# Patient Record
Sex: MALE | Race: BLACK OR AFRICAN AMERICAN | NOT HISPANIC OR LATINO | Employment: FULL TIME | URBAN - NONMETROPOLITAN AREA
[De-identification: names, ages, dates, MRNs, and addresses within clinical notes are randomized per-mention and may not be internally consistent; named-entity substitution may affect disease eponyms.]

---

## 2022-01-21 ENCOUNTER — OFFICE VISIT (OUTPATIENT)
Dept: FAMILY MEDICINE | Facility: CLINIC | Age: 27
End: 2022-01-21
Payer: COMMERCIAL

## 2022-01-21 VITALS
RESPIRATION RATE: 18 BRPM | DIASTOLIC BLOOD PRESSURE: 100 MMHG | OXYGEN SATURATION: 99 % | WEIGHT: 225.38 LBS | TEMPERATURE: 98 F | SYSTOLIC BLOOD PRESSURE: 150 MMHG | HEART RATE: 63 BPM | BODY MASS INDEX: 28.92 KG/M2 | HEIGHT: 74 IN

## 2022-01-21 DIAGNOSIS — K04.7 DENTAL ABSCESS: Primary | ICD-10-CM

## 2022-01-21 DIAGNOSIS — I10 ESSENTIAL HYPERTENSION, BENIGN: ICD-10-CM

## 2022-01-21 PROCEDURE — 3080F DIAST BP >= 90 MM HG: CPT | Mod: ,,, | Performed by: NURSE PRACTITIONER

## 2022-01-21 PROCEDURE — 3077F PR MOST RECENT SYSTOLIC BLOOD PRESSURE >= 140 MM HG: ICD-10-PCS | Mod: ,,, | Performed by: NURSE PRACTITIONER

## 2022-01-21 PROCEDURE — 3080F PR MOST RECENT DIASTOLIC BLOOD PRESSURE >= 90 MM HG: ICD-10-PCS | Mod: ,,, | Performed by: NURSE PRACTITIONER

## 2022-01-21 PROCEDURE — 1159F MED LIST DOCD IN RCRD: CPT | Mod: ,,, | Performed by: NURSE PRACTITIONER

## 2022-01-21 PROCEDURE — 3008F PR BODY MASS INDEX (BMI) DOCUMENTED: ICD-10-PCS | Mod: ,,, | Performed by: NURSE PRACTITIONER

## 2022-01-21 PROCEDURE — 96372 THER/PROPH/DIAG INJ SC/IM: CPT | Mod: ,,, | Performed by: NURSE PRACTITIONER

## 2022-01-21 PROCEDURE — 1160F RVW MEDS BY RX/DR IN RCRD: CPT | Mod: ,,, | Performed by: NURSE PRACTITIONER

## 2022-01-21 PROCEDURE — 96372 PR INJECTION,THERAP/PROPH/DIAG2ST, IM OR SUBCUT: ICD-10-PCS | Mod: ,,, | Performed by: NURSE PRACTITIONER

## 2022-01-21 PROCEDURE — 3008F BODY MASS INDEX DOCD: CPT | Mod: ,,, | Performed by: NURSE PRACTITIONER

## 2022-01-21 PROCEDURE — 1160F PR REVIEW ALL MEDS BY PRESCRIBER/CLIN PHARMACIST DOCUMENTED: ICD-10-PCS | Mod: ,,, | Performed by: NURSE PRACTITIONER

## 2022-01-21 PROCEDURE — 99203 PR OFFICE/OUTPT VISIT, NEW, LEVL III, 30-44 MIN: ICD-10-PCS | Mod: 25,,, | Performed by: NURSE PRACTITIONER

## 2022-01-21 PROCEDURE — 1159F PR MEDICATION LIST DOCUMENTED IN MEDICAL RECORD: ICD-10-PCS | Mod: ,,, | Performed by: NURSE PRACTITIONER

## 2022-01-21 PROCEDURE — 99203 OFFICE O/P NEW LOW 30 MIN: CPT | Mod: 25,,, | Performed by: NURSE PRACTITIONER

## 2022-01-21 PROCEDURE — 3077F SYST BP >= 140 MM HG: CPT | Mod: ,,, | Performed by: NURSE PRACTITIONER

## 2022-01-21 RX ORDER — AMLODIPINE BESYLATE 10 MG/1
10 TABLET ORAL DAILY
Qty: 30 TABLET | Refills: 0 | Status: SHIPPED | OUTPATIENT
Start: 2022-01-21 | End: 2022-03-28 | Stop reason: SDUPTHER

## 2022-01-21 RX ORDER — AMOXICILLIN 500 MG/1
CAPSULE ORAL
COMMUNITY
Start: 2022-01-20 | End: 2022-04-14

## 2022-01-21 RX ORDER — CEFTRIAXONE 1 G/1
1 INJECTION, POWDER, FOR SOLUTION INTRAMUSCULAR; INTRAVENOUS
Status: COMPLETED | OUTPATIENT
Start: 2022-01-21 | End: 2022-01-21

## 2022-01-21 RX ORDER — IBUPROFEN 800 MG/1
800 TABLET ORAL EVERY 6 HOURS PRN
COMMUNITY
Start: 2022-01-20 | End: 2022-04-14

## 2022-01-21 RX ADMIN — CEFTRIAXONE 1 G: 1 INJECTION, POWDER, FOR SOLUTION INTRAMUSCULAR; INTRAVENOUS at 03:01

## 2022-01-21 NOTE — PROGRESS NOTES
TATE White   [place] [facility]  [address]  [place] [address]     PATIENT NAME: Kal Meadows  : 1995  DATE: 22  MRN: [id number]      Billing Provider: TATE White  Level of Service: SD OFFICE/OUTPT VISIT, NEW, LEVL III, 30-44 MIN  Patient PCP Information     Provider PCP Type    TATE White General          Reason for Visit / Chief Complaint: Facial Swelling (Right side of face swollen from tooth since Wednesday.)       Update PCP  Update Chief Complaint         History of Present Illness / Problem Focused Workflow     Kal Meadows presents to the clinic swelling right cheek and upper tooth pain for the past 3-4 days. Went to dentist yesterday and was started on amoxicillin and has appt to return next week to have tooth pulled.  Pt has been on BP medication in the past but would forget to take it.      Review of Systems     Review of Systems   Constitutional: Negative.  Negative for activity change, appetite change and fever.   HENT: Positive for dental problem (dental caries right upper molar). Negative for drooling, ear pain and trouble swallowing.    Respiratory: Negative for cough and shortness of breath.    Cardiovascular: Negative for chest pain.   Gastrointestinal: Negative for abdominal pain, nausea and vomiting.   Neurological: Negative for weakness and headaches.        Medical / Social / Family History     Past Medical History:   Diagnosis Date    Hypertension        History reviewed. No pertinent surgical history.    Social History    reports that he has never smoked. He has never used smokeless tobacco. He reports previous alcohol use. He reports previous drug use.    Family History  's family history includes Hypertension in his father and mother.    Medications and Allergies     Medications  Outpatient Medications Marked as Taking for the 22 encounter (Office Visit) with TATE Corado   Medication Sig Dispense Refill     "amoxicillin (AMOXIL) 500 MG capsule TAKE 2 CAPSULES BY MOUTH NOW AND THEN TAKE 1 CAPSULE BY MOUTH EVERY 6 HOURS UNTIL GONE      ibuprofen (ADVIL,MOTRIN) 800 MG tablet Take 800 mg by mouth every 6 (six) hours as needed.       Current Facility-Administered Medications for the 1/21/22 encounter (Office Visit) with TATE Corado   Medication Dose Route Frequency Provider Last Rate Last Admin    cefTRIAXone injection 1 g  1 g Intramuscular 1 time in Clinic/HOD TATE Corado           Allergies  Review of patient's allergies indicates:  No Known Allergies    Physical Examination     Vitals:    01/21/22 1447 01/21/22 1500   BP: (!) 152/118 (!) 150/100   BP Location: Left arm    Patient Position: Sitting    BP Method: Large (Manual)    Pulse: 63    Resp: 18    Temp: 98.3 °F (36.8 °C)    TempSrc: Oral    SpO2: 99%    Weight: 102.2 kg (225 lb 6.4 oz)    Height: 6' 2" (1.88 m)       Physical Exam  Constitutional:       General: He is not in acute distress.     Appearance: Normal appearance.   HENT:      Nose: No congestion.      Mouth/Throat:      Dentition: Dental tenderness, dental caries and dental abscesses present.     Neck:      Thyroid: No thyromegaly.   Cardiovascular:      Rate and Rhythm: Normal rate and regular rhythm.      Pulses: Normal pulses.      Heart sounds: Normal heart sounds.   Pulmonary:      Effort: Pulmonary effort is normal. No accessory muscle usage or respiratory distress.      Breath sounds: Normal breath sounds.   Abdominal:      Palpations: Abdomen is soft.   Musculoskeletal:         General: Normal range of motion.      Cervical back: Neck supple.   Lymphadenopathy:      Cervical: Cervical adenopathy present.      Right cervical: Superficial cervical adenopathy present.   Skin:     General: Skin is warm and dry.      Coloration: Skin is not jaundiced or pale.   Neurological:      Mental Status: He is alert and oriented to person, place, and time.          Assessment and Plan " (including Health Maintenance)      Problem List  Smart Sets  Document Outside HM   :        Health Maintenance Due   Topic Date Due    Hepatitis C Screening  Never done    Lipid Panel  Never done    COVID-19 Vaccine (1) Never done    HIV Screening  Never done    TETANUS VACCINE  Never done    Influenza Vaccine (1) Never done       Problem List Items Addressed This Visit    None     Visit Diagnoses     Dental abscess    -  Primary    Will give rocephin injection and pt to complete amoxil and follow up with dentist next week    Essential hypertension, benign        Will restart amlodipine 10 mg daily along with low sodium diet. Discussed risks of uncontrolled HTN. Pt. verbalizes understanding        Dental abscess  Comments:  Will give rocephin injection and pt to complete amoxil and follow up with dentist next week    Essential hypertension, benign  Comments:  Will restart amlodipine 10 mg daily along with low sodium diet. Discussed risks of uncontrolled HTN. Pt. verbalizes understanding    Other orders  -     cefTRIAXone injection 1 g  -     amLODIPine (NORVASC) 10 MG tablet; Take 1 tablet (10 mg total) by mouth once daily.  Dispense: 30 tablet; Refill: 0       The patient has no Health Maintenance topics of status Not Due    Procedures       Follow up in about 1 month (around 2/21/2022) for recheck bp or sooner if needed.     Signature:  TATE White    Date of encounter: 1/21/22

## 2022-01-21 NOTE — LETTER
January 21, 2022      [facility]  [address]  [place] [address]  Phone: [phone]  Fax: [phone]       Patient: Kal Meadows   YOB: 1995  Date of Visit: 01/21/2022    To Whom It May Concern:    Young Meadows  was at [facility] on 01/21/2022. The patient may return to work/school on 01/24/2022 with no restrictions. If you have any questions or concerns, or if I can be of further assistance, please do not hesitate to contact me.      Sincerely,    TATE Corado

## 2022-03-28 DIAGNOSIS — I10 ESSENTIAL HYPERTENSION, BENIGN: Primary | ICD-10-CM

## 2022-03-28 RX ORDER — AMLODIPINE BESYLATE 10 MG/1
10 TABLET ORAL DAILY
Qty: 30 TABLET | Refills: 1 | Status: SHIPPED | OUTPATIENT
Start: 2022-03-28 | End: 2022-06-21

## 2022-04-14 ENCOUNTER — OFFICE VISIT (OUTPATIENT)
Dept: FAMILY MEDICINE | Facility: CLINIC | Age: 27
End: 2022-04-14
Payer: COMMERCIAL

## 2022-04-14 VITALS
DIASTOLIC BLOOD PRESSURE: 98 MMHG | RESPIRATION RATE: 16 BRPM | BODY MASS INDEX: 29.37 KG/M2 | TEMPERATURE: 98 F | SYSTOLIC BLOOD PRESSURE: 152 MMHG | WEIGHT: 228.81 LBS | OXYGEN SATURATION: 99 % | HEART RATE: 74 BPM | HEIGHT: 74 IN

## 2022-04-14 DIAGNOSIS — R21 RASH OF PENIS: Primary | ICD-10-CM

## 2022-04-14 PROCEDURE — 3008F BODY MASS INDEX DOCD: CPT | Mod: ,,, | Performed by: FAMILY MEDICINE

## 2022-04-14 PROCEDURE — 86695 HERPES SIMPLEX 1 & 2 IGG: ICD-10-PCS | Mod: ,,, | Performed by: CLINICAL MEDICAL LABORATORY

## 2022-04-14 PROCEDURE — 1159F MED LIST DOCD IN RCRD: CPT | Mod: ,,, | Performed by: FAMILY MEDICINE

## 2022-04-14 PROCEDURE — 86695 HERPES SIMPLEX TYPE 1 TEST: CPT | Mod: ,,, | Performed by: CLINICAL MEDICAL LABORATORY

## 2022-04-14 PROCEDURE — 99213 OFFICE O/P EST LOW 20 MIN: CPT | Mod: ,,, | Performed by: FAMILY MEDICINE

## 2022-04-14 PROCEDURE — 86696 HERPES SIMPLEX 1 & 2 IGG: ICD-10-PCS | Mod: ,,, | Performed by: CLINICAL MEDICAL LABORATORY

## 2022-04-14 PROCEDURE — 3080F PR MOST RECENT DIASTOLIC BLOOD PRESSURE >= 90 MM HG: ICD-10-PCS | Mod: ,,, | Performed by: FAMILY MEDICINE

## 2022-04-14 PROCEDURE — 86694 HERPES SIMPLEX NES ANTBDY: CPT | Mod: ,,, | Performed by: CLINICAL MEDICAL LABORATORY

## 2022-04-14 PROCEDURE — 86696 HERPES SIMPLEX TYPE 2 TEST: CPT | Mod: ,,, | Performed by: CLINICAL MEDICAL LABORATORY

## 2022-04-14 PROCEDURE — 86694 HERPES SIMPLEX 1 & 2 IGM: ICD-10-PCS | Mod: ,,, | Performed by: CLINICAL MEDICAL LABORATORY

## 2022-04-14 PROCEDURE — 99213 PR OFFICE/OUTPT VISIT, EST, LEVL III, 20-29 MIN: ICD-10-PCS | Mod: ,,, | Performed by: FAMILY MEDICINE

## 2022-04-14 PROCEDURE — 3008F PR BODY MASS INDEX (BMI) DOCUMENTED: ICD-10-PCS | Mod: ,,, | Performed by: FAMILY MEDICINE

## 2022-04-14 PROCEDURE — 3077F SYST BP >= 140 MM HG: CPT | Mod: ,,, | Performed by: FAMILY MEDICINE

## 2022-04-14 PROCEDURE — 1159F PR MEDICATION LIST DOCUMENTED IN MEDICAL RECORD: ICD-10-PCS | Mod: ,,, | Performed by: FAMILY MEDICINE

## 2022-04-14 PROCEDURE — 3080F DIAST BP >= 90 MM HG: CPT | Mod: ,,, | Performed by: FAMILY MEDICINE

## 2022-04-14 PROCEDURE — 3077F PR MOST RECENT SYSTOLIC BLOOD PRESSURE >= 140 MM HG: ICD-10-PCS | Mod: ,,, | Performed by: FAMILY MEDICINE

## 2022-04-14 RX ORDER — ACYCLOVIR 50 MG/G
CREAM TOPICAL
Qty: 60 G | Refills: 5 | Status: SHIPPED | OUTPATIENT
Start: 2022-04-14 | End: 2022-07-11

## 2022-04-14 NOTE — ASSESSMENT & PLAN NOTE
A rash around the base of the penis suggest the herpetic virus but well could be a yeast infection.  Will obtain HSV 1 and 2 IgM and IgG titers.  Until we get the titers back will start him on Zovirax cream 5 times daily.  He is to follow-up after the test are back.

## 2022-04-14 NOTE — PROGRESS NOTES
A he her he is patient says she is not a med pop is on high do that her whole   Hussein Hathaway DO   [facility]  [address]  [place]  [address]      PATIENT NAME: Kal Meadows  : 1995  DATE: 22  MRN: [id number]      Billing Provider: Hussein Hathaway DO  Level of Service:   Patient PCP Information     Provider PCP Type    TATE White General          Reason for Visit / Chief Complaint: Dry Skin (Dry,flaky skin to penis that comes and goes x 1 month. )       Update PCP  Update Chief Complaint         History of Present Illness / Problem Focused Workflow     Kal Meadows presents to the clinic with Dry Skin (Dry,flaky skin to penis that comes and goes x 1 month. )     Her patient complaining of a rash around his penis just before the head of the penis.  He states has been there for about a week.  He denies any pain associated with it he denies any drainage associated with it.  He states his sexual partner has no drainage or ulcerations or history of herpes or other sexually transmitted diseases.    Dry Skin  Pertinent negatives include no abdominal pain, arthralgias, change in bowel habit, chest pain, chills, congestion, coughing, fatigue, fever, headaches, myalgias, nausea, neck pain, numbness, rash, sore throat, vertigo, vomiting or weakness.       Review of Systems     Review of Systems   Constitutional: Negative for activity change, appetite change, chills, fatigue and fever.   HENT: Negative for nasal congestion, ear discharge, ear pain, mouth dryness, mouth sores, postnasal drip, sinus pressure/congestion, sore throat and voice change.    Eyes: Negative for pain, discharge, redness, itching and visual disturbance.   Respiratory: Negative for apnea, cough, chest tightness, shortness of breath and wheezing.    Cardiovascular: Negative for chest pain, palpitations and leg swelling.   Gastrointestinal: Negative for abdominal distention, abdominal pain, anal bleeding,  blood in stool, change in bowel habit, constipation, diarrhea, nausea, vomiting, reflux and change in bowel habit.   Endocrine: Negative for cold intolerance, heat intolerance, polydipsia, polyphagia and polyuria.   Genitourinary: Negative for difficulty urinating, enuresis, erectile dysfunction, frequency, genital sores, hematuria and urgency.   Musculoskeletal: Negative for arthralgias, back pain, gait problem, leg pain, myalgias and neck pain.   Integumentary:  Negative for rash, mole/lesion, breast mass and breast discharge.   Allergic/Immunologic: Negative for environmental allergies and food allergies.   Neurological: Negative for dizziness, vertigo, tremors, seizures, syncope, facial asymmetry, speech difficulty, weakness, light-headedness, numbness, headaches, disturbances in coordination, memory loss and coordination difficulties.   Hematological: Negative for adenopathy. Does not bruise/bleed easily.   Psychiatric/Behavioral: Negative for agitation, behavioral problems, confusion, decreased concentration, dysphoric mood, hallucinations, self-injury, sleep disturbance and suicidal ideas. The patient is not nervous/anxious and is not hyperactive.    Breast: Negative for mass      Medical / Social / Family History     Past Medical History:   Diagnosis Date    Hypertension        Past Surgical History:   Procedure Laterality Date    CIRCUMCISION         Social History    reports that he has never smoked. He has never used smokeless tobacco. He reports previous alcohol use. He reports previous drug use.    Family History  's family history includes Hypertension in his father and mother.    Medications and Allergies     Medications  Outpatient Medications Marked as Taking for the 4/14/22 encounter (Office Visit) with Hussein Hathaway, DO   Medication Sig Dispense Refill    amLODIPine (NORVASC) 10 MG tablet Take 1 tablet (10 mg total) by mouth once daily. 30 tablet 1       Allergies  Review of patient's  allergies indicates:  No Known Allergies    Physical Examination     Vitals:    04/14/22 1538   BP: (!) 152/98   Pulse: 74   Resp: 16   Temp: 97.9 °F (36.6 °C)     Physical Exam  Constitutional:       General: He is not in acute distress.     Appearance: Normal appearance. He is normal weight.   Genitourinary:     Testes: Normal.      Comments: Shaft of the penis just prior to the head there was noted a dry skin with the some ulcerations x3 noted on the right penile shaft just proximal to the end of the penis.  No drainage was noted.  No ulcerations noted otherwise.  No adenopathy noted.  Neurological:      Mental Status: He is alert.               No results found for: WBC, HGB, HCT, MCV, PLT       No results found for: NA, K, CL, CO2, GLU, BUN, CREATININE, CALCIUM, PROT, ALBUMIN, BILITOT, ALKPHOS, AST, ALT, ANIONGAP, ESTGFRAFRICA, EGFRNONAA   No image results found.     Procedures   Assessment and Plan (including Health Maintenance)      Problem List  Smart Sets  Document Outside HM   :    Plan:         Health Maintenance Due   Topic Date Due    Hepatitis C Screening  Never done    Lipid Panel  Never done    COVID-19 Vaccine (1) Never done    HIV Screening  Never done    TETANUS VACCINE  Never done    Influenza Vaccine (1) Never done       Problem List Items Addressed This Visit        Derm    Rash of penis - Primary    Current Assessment & Plan     A rash around the base of the penis suggest the herpetic virus but well could be a yeast infection.  Will obtain HSV 1 and 2 IgM and IgG titers.  Until we get the titers back will start him on Zovirax cream 5 times daily.  He is to follow-up after the test are back.           Relevant Medications    acyclovir 5% (ZOVIRAX) 5 % Crea    Other Relevant Orders    HSV 1 & 2, IgG    HSV 1 & 2, IgM          The patient has no Health Maintenance topics of status Not Due    Future Appointments   Date Time Provider Department Center   5/16/2022  3:00 PM [facility]        Follow up in about 1 week (around 4/21/2022).     Signature:  Hussein Hathaway, [address] [place]  [address]    Date of encounter: 4/14/22

## 2022-04-19 LAB
HSV IGM SER QL IA: NEGATIVE
HSV TYPE 1 AB IGG INDEX: 0.18
HSV TYPE 2 AB IGG INDEX: 0.02
HSV1 IGG SER QL: NEGATIVE
HSV2 IGG SER QL: NEGATIVE

## 2022-04-26 ENCOUNTER — TELEPHONE (OUTPATIENT)
Dept: FAMILY MEDICINE | Facility: CLINIC | Age: 27
End: 2022-04-26
Payer: COMMERCIAL

## 2022-06-21 DIAGNOSIS — I10 ESSENTIAL HYPERTENSION, BENIGN: ICD-10-CM

## 2022-06-21 RX ORDER — AMLODIPINE BESYLATE 10 MG/1
TABLET ORAL
Qty: 30 TABLET | Refills: 0 | Status: SHIPPED | OUTPATIENT
Start: 2022-06-21 | End: 2022-07-11 | Stop reason: DRUGHIGH

## 2022-07-11 ENCOUNTER — OFFICE VISIT (OUTPATIENT)
Dept: FAMILY MEDICINE | Facility: CLINIC | Age: 27
End: 2022-07-11
Payer: COMMERCIAL

## 2022-07-11 VITALS
WEIGHT: 234.81 LBS | BODY MASS INDEX: 30.14 KG/M2 | RESPIRATION RATE: 18 BRPM | TEMPERATURE: 98 F | DIASTOLIC BLOOD PRESSURE: 100 MMHG | HEIGHT: 74 IN | OXYGEN SATURATION: 99 % | HEART RATE: 73 BPM | SYSTOLIC BLOOD PRESSURE: 140 MMHG

## 2022-07-11 DIAGNOSIS — Z13.220 SCREENING FOR LIPOID DISORDERS: ICD-10-CM

## 2022-07-11 DIAGNOSIS — Z13.1 SCREENING FOR DIABETES MELLITUS: ICD-10-CM

## 2022-07-11 DIAGNOSIS — I10 ESSENTIAL HYPERTENSION, BENIGN: ICD-10-CM

## 2022-07-11 DIAGNOSIS — Z00.00 ROUTINE MEDICAL EXAM: Primary | ICD-10-CM

## 2022-07-11 PROBLEM — R21 RASH OF PENIS: Status: RESOLVED | Noted: 2022-04-14 | Resolved: 2022-07-11

## 2022-07-11 LAB
ANION GAP SERPL CALCULATED.3IONS-SCNC: 10 MMOL/L (ref 7–16)
BUN SERPL-MCNC: 15 MG/DL (ref 7–18)
BUN/CREAT SERPL: 12 (ref 6–20)
CALCIUM SERPL-MCNC: 9 MG/DL (ref 8.5–10.1)
CHLORIDE SERPL-SCNC: 105 MMOL/L (ref 98–107)
CHOLEST SERPL-MCNC: 157 MG/DL (ref 0–200)
CHOLEST/HDLC SERPL: 2.7 {RATIO}
CO2 SERPL-SCNC: 30 MMOL/L (ref 21–32)
CREAT SERPL-MCNC: 1.22 MG/DL (ref 0.7–1.3)
GLUCOSE SERPL-MCNC: 80 MG/DL (ref 74–106)
HDLC SERPL-MCNC: 58 MG/DL (ref 40–60)
LDLC SERPL CALC-MCNC: 69 MG/DL
LDLC/HDLC SERPL: 1.2 {RATIO}
NONHDLC SERPL-MCNC: 99 MG/DL
POTASSIUM SERPL-SCNC: 3.8 MMOL/L (ref 3.5–5.1)
SODIUM SERPL-SCNC: 141 MMOL/L (ref 136–145)
TRIGL SERPL-MCNC: 149 MG/DL (ref 35–150)
VLDLC SERPL-MCNC: 30 MG/DL

## 2022-07-11 PROCEDURE — 1160F PR REVIEW ALL MEDS BY PRESCRIBER/CLIN PHARMACIST DOCUMENTED: ICD-10-PCS | Mod: ,,, | Performed by: NURSE PRACTITIONER

## 2022-07-11 PROCEDURE — 80061 LIPID PANEL: ICD-10-PCS | Mod: ,,, | Performed by: CLINICAL MEDICAL LABORATORY

## 2022-07-11 PROCEDURE — 80048 BASIC METABOLIC PANEL: ICD-10-PCS | Mod: ,,, | Performed by: CLINICAL MEDICAL LABORATORY

## 2022-07-11 PROCEDURE — 80048 BASIC METABOLIC PNL TOTAL CA: CPT | Mod: ,,, | Performed by: CLINICAL MEDICAL LABORATORY

## 2022-07-11 PROCEDURE — 3080F DIAST BP >= 90 MM HG: CPT | Mod: ,,, | Performed by: NURSE PRACTITIONER

## 2022-07-11 PROCEDURE — 4010F PR ACE/ARB THEARPY RXD/TAKEN: ICD-10-PCS | Mod: ,,, | Performed by: NURSE PRACTITIONER

## 2022-07-11 PROCEDURE — 4010F ACE/ARB THERAPY RXD/TAKEN: CPT | Mod: ,,, | Performed by: NURSE PRACTITIONER

## 2022-07-11 PROCEDURE — 3077F PR MOST RECENT SYSTOLIC BLOOD PRESSURE >= 140 MM HG: ICD-10-PCS | Mod: ,,, | Performed by: NURSE PRACTITIONER

## 2022-07-11 PROCEDURE — 3080F PR MOST RECENT DIASTOLIC BLOOD PRESSURE >= 90 MM HG: ICD-10-PCS | Mod: ,,, | Performed by: NURSE PRACTITIONER

## 2022-07-11 PROCEDURE — 1159F MED LIST DOCD IN RCRD: CPT | Mod: ,,, | Performed by: NURSE PRACTITIONER

## 2022-07-11 PROCEDURE — 1160F RVW MEDS BY RX/DR IN RCRD: CPT | Mod: ,,, | Performed by: NURSE PRACTITIONER

## 2022-07-11 PROCEDURE — 1159F PR MEDICATION LIST DOCUMENTED IN MEDICAL RECORD: ICD-10-PCS | Mod: ,,, | Performed by: NURSE PRACTITIONER

## 2022-07-11 PROCEDURE — 3008F PR BODY MASS INDEX (BMI) DOCUMENTED: ICD-10-PCS | Mod: ,,, | Performed by: NURSE PRACTITIONER

## 2022-07-11 PROCEDURE — 99395 PREV VISIT EST AGE 18-39: CPT | Mod: ,,, | Performed by: NURSE PRACTITIONER

## 2022-07-11 PROCEDURE — 3008F BODY MASS INDEX DOCD: CPT | Mod: ,,, | Performed by: NURSE PRACTITIONER

## 2022-07-11 PROCEDURE — 80061 LIPID PANEL: CPT | Mod: ,,, | Performed by: CLINICAL MEDICAL LABORATORY

## 2022-07-11 PROCEDURE — 3077F SYST BP >= 140 MM HG: CPT | Mod: ,,, | Performed by: NURSE PRACTITIONER

## 2022-07-11 PROCEDURE — 99395 PR PREVENTIVE VISIT,EST,18-39: ICD-10-PCS | Mod: ,,, | Performed by: NURSE PRACTITIONER

## 2022-07-11 RX ORDER — AMLODIPINE AND OLMESARTAN MEDOXOMIL 10; 20 MG/1; MG/1
1 TABLET ORAL DAILY
Qty: 30 TABLET | Refills: 0 | Status: SHIPPED | OUTPATIENT
Start: 2022-07-11 | End: 2022-08-01 | Stop reason: SDUPTHER

## 2022-07-11 NOTE — PROGRESS NOTES
TATE White   [facility]  [address]  [place] [address]     PATIENT NAME: Kal Meadows  : 1995  DATE: 22  MRN: [id number]      Billing Provider: TATE White  Level of Service: [place] PREVENTIVE VISIT,EST,18-39  Patient PCP Information     Provider PCP Type    TATE White General          Reason for Visit / Chief Complaint: Annual Exam (Here for Healthy You visit.)       Update PCP  Update Chief Complaint         History of Present Illness / Problem Focused Workflow     Kal Meadows presents to the clinic for healthy you and follow up on HTN.  Pt has not been taking BP medication daily as directed and has noticed frequent headaches.      Review of Systems     Review of Systems   Constitutional: Negative.  Negative for activity change, appetite change and unexpected weight change.   HENT: Negative for trouble swallowing.    Eyes: Negative for visual disturbance.   Respiratory: Negative for cough, chest tightness and shortness of breath.    Cardiovascular: Negative for chest pain and palpitations.   Gastrointestinal: Negative for abdominal pain, change in bowel habit, nausea, vomiting and change in bowel habit.   Endocrine: Negative for cold intolerance, heat intolerance, polydipsia, polyphagia and polyuria.   Genitourinary: Negative for frequency and urgency.   Musculoskeletal: Negative for arthralgias.   Neurological: Positive for headaches. Negative for dizziness and weakness.        Medical / Social / Family History     Past Medical History:   Diagnosis Date    Hypertension        Past Surgical History:   Procedure Laterality Date    CIRCUMCISION         Social History    reports that he has never smoked. He has never used smokeless tobacco. He reports previous alcohol use. He reports previous drug use.    Family History  's family history includes Hypertension in his father and mother.    Medications and Allergies     Medications  Outpatient  "Medications Marked as Taking for the 7/11/22 encounter (Office Visit) with TATE Corado   Medication Sig Dispense Refill    [DISCONTINUED] amLODIPine (NORVASC) 10 MG tablet Take 1 tablet by mouth once daily 30 tablet 0       Allergies  Review of patient's allergies indicates:  No Known Allergies    Physical Examination     Vitals:    07/11/22 1546 07/11/22 1554   BP: (!) 140/100 (!) 140/100   BP Location: Left arm Left arm   Patient Position: Sitting Sitting   BP Method: Medium (Manual) Medium (Manual)   Pulse: 73    Resp: 18    Temp: 98.2 °F (36.8 °C)    TempSrc: Temporal    SpO2: 99%    Weight: 106.5 kg (234 lb 12.8 oz)    Height: 6' 2" (1.88 m)       Physical Exam  Constitutional:       General: He is not in acute distress.     Appearance: Normal appearance.   HENT:      Nose: No congestion.   Neck:      Thyroid: No thyromegaly.      Vascular: No carotid bruit.   Cardiovascular:      Rate and Rhythm: Normal rate and regular rhythm.      Pulses:           Carotid pulses are 3+ on the right side and 3+ on the left side.       Posterior tibial pulses are 3+ on the right side and 3+ on the left side.      Heart sounds: Normal heart sounds. No murmur heard.  Pulmonary:      Effort: Pulmonary effort is normal. No accessory muscle usage or respiratory distress.      Breath sounds: Normal breath sounds. No wheezing, rhonchi or rales.   Abdominal:      General: Bowel sounds are normal. There is no distension.      Palpations: Abdomen is soft.      Tenderness: There is no abdominal tenderness.   Musculoskeletal:         General: Normal range of motion.      Cervical back: Neck supple.      Right lower leg: No edema.      Left lower leg: No edema.   Skin:     General: Skin is warm and dry.      Capillary Refill: Capillary refill takes less than 2 seconds.      Coloration: Skin is not jaundiced or pale.   Neurological:      Mental Status: He is alert and oriented to person, place, and time.      Gait: Gait is " intact.   Psychiatric:         Mood and Affect: Mood normal.         Thought Content: Thought content normal.          Assessment and Plan (including Health Maintenance)      Problem List  Smart Sets  Document Outside HM   :          Health Maintenance Due   Topic Date Due    Lipid Panel  Never done    TETANUS VACCINE  Never done       Problem List Items Addressed This Visit    None     Visit Diagnoses     Routine medical exam    -  Primary    Will check routine labs and BMP to evaluate renal function since BP is high.    Screening for lipoid disorders        Fasting lipids ordered. LDL goal < 70    Relevant Orders    Lipid Panel    Screening for diabetes mellitus        Glucose ordered    Relevant Orders    Glucose, Random    Essential hypertension, benign        Will change medication to amlodipine 10 mg/ olmesartan 20 mg daily. Take med as directed.    Check BP at home daily. Low soidium diet.    Relevant Medications    amlodipine-olmesartan (CHRISTIANA) 10-20 mg per tablet    Other Relevant Orders    Basic Metabolic Panel        Routine medical exam  Comments:  Will check routine labs and BMP to evaluate renal function since BP is high.    Screening for lipoid disorders  Comments:  Fasting lipids ordered. LDL goal < 70  Orders:  -     Lipid Panel; Future; Expected date: 07/11/2022    Screening for diabetes mellitus  Comments:  Glucose ordered  Orders:  -     Glucose, Random; Future; Expected date: 07/11/2022    Essential hypertension, benign  Comments:  Will change medication to amlodipine 10 mg/ olmesartan 20 mg daily. Take med as directed.    Check BP at home daily. Low soidium diet.  Orders:  -     amlodipine-olmesartan (CHRISTIANA) 10-20 mg per tablet; Take 1 tablet by mouth once daily.  Dispense: 30 tablet; Refill: 0  -     Basic Metabolic Panel; Future; Expected date: 07/11/2022       Health Maintenance Topics with due status: Not Due       Topic Last Completion Date    Influenza Vaccine Not Due       Procedures      Future Appointments   Date Time Provider Department Center   8/1/2022  3:30 PM TATE Corado [facility]   [facility]12[facility]20[facility]        Follow up in about 3 weeks (around 8/1/2022) for recheck BP.     Signature:  TATE White    Date of encounter: 7/11/22

## 2022-08-01 ENCOUNTER — OFFICE VISIT (OUTPATIENT)
Dept: FAMILY MEDICINE | Facility: CLINIC | Age: 27
End: 2022-08-01
Payer: COMMERCIAL

## 2022-08-01 VITALS
HEIGHT: 74 IN | SYSTOLIC BLOOD PRESSURE: 128 MMHG | DIASTOLIC BLOOD PRESSURE: 90 MMHG | TEMPERATURE: 98 F | BODY MASS INDEX: 30.52 KG/M2 | RESPIRATION RATE: 18 BRPM | OXYGEN SATURATION: 99 % | HEART RATE: 85 BPM | WEIGHT: 237.81 LBS

## 2022-08-01 DIAGNOSIS — I10 ESSENTIAL HYPERTENSION, BENIGN: Primary | ICD-10-CM

## 2022-08-01 PROCEDURE — 3074F SYST BP LT 130 MM HG: CPT | Mod: ,,, | Performed by: NURSE PRACTITIONER

## 2022-08-01 PROCEDURE — 3080F DIAST BP >= 90 MM HG: CPT | Mod: ,,, | Performed by: NURSE PRACTITIONER

## 2022-08-01 PROCEDURE — 99213 OFFICE O/P EST LOW 20 MIN: CPT | Mod: ,,, | Performed by: NURSE PRACTITIONER

## 2022-08-01 PROCEDURE — 99213 PR OFFICE/OUTPT VISIT, EST, LEVL III, 20-29 MIN: ICD-10-PCS | Mod: ,,, | Performed by: NURSE PRACTITIONER

## 2022-08-01 PROCEDURE — 3008F PR BODY MASS INDEX (BMI) DOCUMENTED: ICD-10-PCS | Mod: ,,, | Performed by: NURSE PRACTITIONER

## 2022-08-01 PROCEDURE — 1159F PR MEDICATION LIST DOCUMENTED IN MEDICAL RECORD: ICD-10-PCS | Mod: ,,, | Performed by: NURSE PRACTITIONER

## 2022-08-01 PROCEDURE — 3080F PR MOST RECENT DIASTOLIC BLOOD PRESSURE >= 90 MM HG: ICD-10-PCS | Mod: ,,, | Performed by: NURSE PRACTITIONER

## 2022-08-01 PROCEDURE — 4010F ACE/ARB THERAPY RXD/TAKEN: CPT | Mod: ,,, | Performed by: NURSE PRACTITIONER

## 2022-08-01 PROCEDURE — 4010F PR ACE/ARB THEARPY RXD/TAKEN: ICD-10-PCS | Mod: ,,, | Performed by: NURSE PRACTITIONER

## 2022-08-01 PROCEDURE — 1159F MED LIST DOCD IN RCRD: CPT | Mod: ,,, | Performed by: NURSE PRACTITIONER

## 2022-08-01 PROCEDURE — 3008F BODY MASS INDEX DOCD: CPT | Mod: ,,, | Performed by: NURSE PRACTITIONER

## 2022-08-01 PROCEDURE — 1160F PR REVIEW ALL MEDS BY PRESCRIBER/CLIN PHARMACIST DOCUMENTED: ICD-10-PCS | Mod: ,,, | Performed by: NURSE PRACTITIONER

## 2022-08-01 PROCEDURE — 1160F RVW MEDS BY RX/DR IN RCRD: CPT | Mod: ,,, | Performed by: NURSE PRACTITIONER

## 2022-08-01 PROCEDURE — 3074F PR MOST RECENT SYSTOLIC BLOOD PRESSURE < 130 MM HG: ICD-10-PCS | Mod: ,,, | Performed by: NURSE PRACTITIONER

## 2022-08-01 RX ORDER — AMLODIPINE AND OLMESARTAN MEDOXOMIL 10; 20 MG/1; MG/1
1 TABLET ORAL DAILY
Qty: 30 TABLET | Refills: 0 | Status: SHIPPED | OUTPATIENT
Start: 2022-08-01 | End: 2022-10-18

## 2022-08-01 NOTE — PROGRESS NOTES
TATE White   [facility]  [address]  [place] [address]     PATIENT NAME: Kal Meadows  : 1995  DATE: 22  MRN: [id number]      Billing Provider: TATE White  Level of Service: [place] OFFICE/OUTPT VISIT, EST, LEVL III, 20-29 MIN  Patient PCP Information     Provider PCP Type    TATE White General          Reason for Visit / Chief Complaint: Follow-up (Color Me Healthy)       Update PCP  Update Chief Complaint         History of Present Illness / Problem Focused Workflow     Kal Meadows presents to the clinic for CM for HTN. Pt took BP medication just prior to office visit today. Denies headache, dizziness or vision problems.        Review of Systems     Review of Systems   Constitutional: Negative.  Negative for activity change, appetite change and fatigue.   HENT: Negative for trouble swallowing and voice change.    Eyes: Negative for visual disturbance.   Respiratory: Negative for cough, chest tightness and shortness of breath.    Cardiovascular: Negative for chest pain and palpitations.   Gastrointestinal: Negative for abdominal pain, change in bowel habit, nausea, vomiting and change in bowel habit.   Endocrine: Negative for cold intolerance, heat intolerance, polydipsia, polyphagia and polyuria.   Genitourinary: Negative for difficulty urinating and frequency.   Musculoskeletal: Negative for gait problem.   Neurological: Negative for dizziness, weakness and headaches.        Medical / Social / Family History     Past Medical History:   Diagnosis Date    Hypertension        Past Surgical History:   Procedure Laterality Date    CIRCUMCISION         Social History    reports that he has never smoked. He has never used smokeless tobacco. He reports previous alcohol use. He reports previous drug use.    Family History  's family history includes Hypertension in his father and mother.    Medications and Allergies     Medications  Outpatient  "Medications Marked as Taking for the 8/1/22 encounter (Office Visit) with TATE Corado   Medication Sig Dispense Refill    [DISCONTINUED] amlodipine-olmesartan (CHRISTIANA) 10-20 mg per tablet Take 1 tablet by mouth once daily. 30 tablet 0       Allergies  Review of patient's allergies indicates:  No Known Allergies    Physical Examination     Vitals:    08/01/22 1600   BP: (!) 128/90   Pulse: 85   Resp: 18   Temp: 97.5 °F (36.4 °C)   SpO2: 99%   Weight: 107.9 kg (237 lb 12.8 oz)   Height: 6' 2" (1.88 m)      Physical Exam  Constitutional:       General: He is not in acute distress.     Appearance: Normal appearance.   HENT:      Nose: No congestion.      Mouth/Throat:      Mouth: Mucous membranes are moist.   Eyes:      General: No scleral icterus.     Conjunctiva/sclera: Conjunctivae normal.      Pupils: Pupils are equal, round, and reactive to light.   Neck:      Thyroid: No thyromegaly.      Vascular: No carotid bruit or JVD.   Cardiovascular:      Rate and Rhythm: Normal rate and regular rhythm.      Pulses:           Carotid pulses are 3+ on the right side and 3+ on the left side.       Posterior tibial pulses are 3+ on the right side.      Heart sounds: Normal heart sounds. No murmur heard.  Pulmonary:      Effort: Pulmonary effort is normal. No accessory muscle usage or respiratory distress.      Breath sounds: Normal breath sounds. No wheezing, rhonchi or rales.   Abdominal:      General: Bowel sounds are normal. There is no distension.      Palpations: Abdomen is soft.      Tenderness: There is no abdominal tenderness.   Musculoskeletal:         General: Normal range of motion.      Cervical back: Neck supple.      Right lower leg: No edema.      Left lower leg: No edema.   Skin:     General: Skin is warm and dry.      Capillary Refill: Capillary refill takes less than 2 seconds.      Coloration: Skin is not jaundiced or pale.   Neurological:      General: No focal deficit present.      Mental Status: " He is alert and oriented to person, place, and time.      Gait: Gait is intact.          Assessment and Plan (including Health Maintenance)      Problem List  Smart Sets  Document Outside HM   :  Lab Results   Component Value Date    CHOL 157 07/11/2022     Lab Results   Component Value Date    HDL 58 07/11/2022     Lab Results   Component Value Date    LDLCALC 69 07/11/2022     Lab Results   Component Value Date    TRIG 149 07/11/2022     Lab Results   Component Value Date    CHOLHDL 2.7 07/11/2022     CMP  Sodium   Date Value Ref Range Status   07/11/2022 141 136 - 145 mmol/L Final     Potassium   Date Value Ref Range Status   07/11/2022 3.8 3.5 - 5.1 mmol/L Final     Chloride   Date Value Ref Range Status   07/11/2022 105 98 - 107 mmol/L Final     CO2   Date Value Ref Range Status   07/11/2022 30 21 - 32 mmol/L Final     Glucose   Date Value Ref Range Status   07/11/2022 80 74 - 106 mg/dL Final     BUN   Date Value Ref Range Status   07/11/2022 15 7 - 18 mg/dL Final     Creatinine   Date Value Ref Range Status   07/11/2022 1.22 0.70 - 1.30 mg/dL Final     Calcium   Date Value Ref Range Status   07/11/2022 9.0 8.5 - 10.1 mg/dL Final     Anion Gap   Date Value Ref Range Status   07/11/2022 10 7 - 16 mmol/L Final     eGFR   Date Value Ref Range Status   07/11/2022 76 >=60 mL/min/1.73m² Final           Health Maintenance Due   Topic Date Due    TETANUS VACCINE  Never done       Problem List Items Addressed This Visit    None     Visit Diagnoses     Essential hypertension, benign    -  Primary    Continue amlodipine 10 mg/ olmesartan 20 mg daily. Take med as directed.    Check BP at home daily. Low soidium diet.    Relevant Medications    amlodipine-olmesartan (CHRISTIANA) 10-20 mg per tablet        Essential hypertension, benign  Comments:  Continue amlodipine 10 mg/ olmesartan 20 mg daily. Take med as directed.    Check BP at home daily. Low soidium diet.  Orders:  -     amlodipine-olmesartan (CHRISTIANA) 10-20 mg per tablet;  Take 1 tablet by mouth once daily.  Dispense: 30 tablet; Refill: 0       Health Maintenance Topics with due status: Not Due       Topic Last Completion Date    Influenza Vaccine Not Due       Procedures     Future Appointments   Date Time Provider Department Center   11/1/2022  3:30 PM TATE Corado [facility] [place]   7/12/2023  3:30 PM TATE Corado [facility] [place]        Follow up in about 1 month (around 9/1/2022) for nurse to recheck BP.     Signature:  TATE White    Date of encounter: 8/1/22

## 2022-10-17 DIAGNOSIS — I10 ESSENTIAL HYPERTENSION, BENIGN: ICD-10-CM

## 2022-10-18 RX ORDER — AMLODIPINE AND OLMESARTAN MEDOXOMIL 10; 20 MG/1; MG/1
1 TABLET ORAL DAILY
Qty: 30 TABLET | Refills: 0 | Status: SHIPPED | OUTPATIENT
Start: 2022-10-18 | End: 2022-12-19 | Stop reason: SDUPTHER

## 2022-12-19 DIAGNOSIS — I10 ESSENTIAL HYPERTENSION, BENIGN: Primary | ICD-10-CM

## 2022-12-19 RX ORDER — AMLODIPINE AND OLMESARTAN MEDOXOMIL 10; 20 MG/1; MG/1
1 TABLET ORAL DAILY
Qty: 30 TABLET | Refills: 0 | Status: SHIPPED | OUTPATIENT
Start: 2022-12-19 | End: 2023-01-20 | Stop reason: SDUPTHER

## 2023-01-20 DIAGNOSIS — I10 ESSENTIAL HYPERTENSION, BENIGN: ICD-10-CM

## 2023-01-20 RX ORDER — AMLODIPINE AND OLMESARTAN MEDOXOMIL 10; 20 MG/1; MG/1
1 TABLET ORAL DAILY
Qty: 30 TABLET | Refills: 0 | Status: SHIPPED | OUTPATIENT
Start: 2023-01-20 | End: 2023-03-08 | Stop reason: SDUPTHER

## 2023-03-08 ENCOUNTER — OFFICE VISIT (OUTPATIENT)
Dept: FAMILY MEDICINE | Facility: CLINIC | Age: 28
End: 2023-03-08
Payer: COMMERCIAL

## 2023-03-08 DIAGNOSIS — I10 ESSENTIAL HYPERTENSION, BENIGN: Primary | ICD-10-CM

## 2023-03-08 DIAGNOSIS — Z11.59 ENCOUNTER FOR SCREENING FOR OTHER VIRAL DISEASES: ICD-10-CM

## 2023-03-08 LAB
ALBUMIN SERPL BCP-MCNC: 4.1 G/DL (ref 3.5–5)
ALBUMIN/GLOB SERPL: 1.2 {RATIO}
ALP SERPL-CCNC: 69 U/L (ref 45–115)
ALT SERPL W P-5'-P-CCNC: 28 U/L (ref 16–61)
ANION GAP SERPL CALCULATED.3IONS-SCNC: 7 MMOL/L (ref 7–16)
AST SERPL W P-5'-P-CCNC: 18 U/L (ref 15–37)
BASOPHILS # BLD AUTO: 0.03 K/UL (ref 0–0.2)
BASOPHILS NFR BLD AUTO: 0.6 % (ref 0–1)
BILIRUB SERPL-MCNC: 0.6 MG/DL (ref ?–1.2)
BUN SERPL-MCNC: 12 MG/DL (ref 7–18)
BUN/CREAT SERPL: 11 (ref 6–20)
CALCIUM SERPL-MCNC: 8.9 MG/DL (ref 8.5–10.1)
CHLORIDE SERPL-SCNC: 106 MMOL/L (ref 98–107)
CHOLEST SERPL-MCNC: 146 MG/DL (ref 0–200)
CHOLEST/HDLC SERPL: 3.6 {RATIO}
CO2 SERPL-SCNC: 31 MMOL/L (ref 21–32)
CREAT SERPL-MCNC: 1.14 MG/DL (ref 0.7–1.3)
DIFFERENTIAL METHOD BLD: ABNORMAL
EGFR (NO RACE VARIABLE) (RUSH/TITUS): 90 ML/MIN/1.73M²
EOSINOPHIL # BLD AUTO: 0.35 K/UL (ref 0–0.5)
EOSINOPHIL NFR BLD AUTO: 6.8 % (ref 1–4)
ERYTHROCYTE [DISTWIDTH] IN BLOOD BY AUTOMATED COUNT: 14.6 % (ref 11.5–14.5)
GLOBULIN SER-MCNC: 3.4 G/DL (ref 2–4)
GLUCOSE SERPL-MCNC: 77 MG/DL (ref 74–106)
HCT VFR BLD AUTO: 44.6 % (ref 40–54)
HCV AB SER QL: NORMAL
HDLC SERPL-MCNC: 41 MG/DL (ref 40–60)
HGB BLD-MCNC: 14.3 G/DL (ref 13.5–18)
HIV 1+O+2 AB SERPL QL: NORMAL
IMM GRANULOCYTES # BLD AUTO: 0.01 K/UL (ref 0–0.04)
IMM GRANULOCYTES NFR BLD: 0.2 % (ref 0–0.4)
LDLC SERPL CALC-MCNC: 93 MG/DL
LDLC/HDLC SERPL: 2.3 {RATIO}
LYMPHOCYTES # BLD AUTO: 2.54 K/UL (ref 1–4.8)
LYMPHOCYTES NFR BLD AUTO: 49.5 % (ref 27–41)
MCH RBC QN AUTO: 25.7 PG (ref 27–31)
MCHC RBC AUTO-ENTMCNC: 32.1 G/DL (ref 32–36)
MCV RBC AUTO: 80.2 FL (ref 80–96)
MONOCYTES # BLD AUTO: 0.38 K/UL (ref 0–0.8)
MONOCYTES NFR BLD AUTO: 7.4 % (ref 2–6)
MPC BLD CALC-MCNC: 10.3 FL (ref 9.4–12.4)
NEUTROPHILS # BLD AUTO: 1.82 K/UL (ref 1.8–7.7)
NEUTROPHILS NFR BLD AUTO: 35.5 % (ref 53–65)
NONHDLC SERPL-MCNC: 105 MG/DL
NRBC # BLD AUTO: 0 X10E3/UL
NRBC, AUTO (.00): 0 %
PLATELET # BLD AUTO: 275 K/UL (ref 150–400)
POTASSIUM SERPL-SCNC: 4 MMOL/L (ref 3.5–5.1)
PROT SERPL-MCNC: 7.5 G/DL (ref 6.4–8.2)
RBC # BLD AUTO: 5.56 M/UL (ref 4.6–6.2)
SODIUM SERPL-SCNC: 140 MMOL/L (ref 136–145)
TRIGL SERPL-MCNC: 58 MG/DL (ref 35–150)
VLDLC SERPL-MCNC: 12 MG/DL
WBC # BLD AUTO: 5.13 K/UL (ref 4.5–11)

## 2023-03-08 PROCEDURE — 86803 HEPATITIS C AB TEST: CPT | Mod: ,,, | Performed by: CLINICAL MEDICAL LABORATORY

## 2023-03-08 PROCEDURE — 1160F PR REVIEW ALL MEDS BY PRESCRIBER/CLIN PHARMACIST DOCUMENTED: ICD-10-PCS | Mod: ,,, | Performed by: NURSE PRACTITIONER

## 2023-03-08 PROCEDURE — 3077F SYST BP >= 140 MM HG: CPT | Mod: ,,, | Performed by: NURSE PRACTITIONER

## 2023-03-08 PROCEDURE — 86803 HEPATITIS C ANTIBODY: ICD-10-PCS | Mod: ,,, | Performed by: CLINICAL MEDICAL LABORATORY

## 2023-03-08 PROCEDURE — 3080F PR MOST RECENT DIASTOLIC BLOOD PRESSURE >= 90 MM HG: ICD-10-PCS | Mod: ,,, | Performed by: NURSE PRACTITIONER

## 2023-03-08 PROCEDURE — 3008F PR BODY MASS INDEX (BMI) DOCUMENTED: ICD-10-PCS | Mod: ,,, | Performed by: NURSE PRACTITIONER

## 2023-03-08 PROCEDURE — 80053 COMPREHENSIVE METABOLIC PANEL: ICD-10-PCS | Mod: ,,, | Performed by: CLINICAL MEDICAL LABORATORY

## 2023-03-08 PROCEDURE — 4010F ACE/ARB THERAPY RXD/TAKEN: CPT | Mod: ,,, | Performed by: NURSE PRACTITIONER

## 2023-03-08 PROCEDURE — 1159F PR MEDICATION LIST DOCUMENTED IN MEDICAL RECORD: ICD-10-PCS | Mod: ,,, | Performed by: NURSE PRACTITIONER

## 2023-03-08 PROCEDURE — 87389 HIV 1 / 2 ANTIBODY: ICD-10-PCS | Mod: ,,, | Performed by: CLINICAL MEDICAL LABORATORY

## 2023-03-08 PROCEDURE — 80053 COMPREHEN METABOLIC PANEL: CPT | Mod: ,,, | Performed by: CLINICAL MEDICAL LABORATORY

## 2023-03-08 PROCEDURE — 3008F BODY MASS INDEX DOCD: CPT | Mod: ,,, | Performed by: NURSE PRACTITIONER

## 2023-03-08 PROCEDURE — 99214 PR OFFICE/OUTPT VISIT, EST, LEVL IV, 30-39 MIN: ICD-10-PCS | Mod: ,,, | Performed by: NURSE PRACTITIONER

## 2023-03-08 PROCEDURE — 1160F RVW MEDS BY RX/DR IN RCRD: CPT | Mod: ,,, | Performed by: NURSE PRACTITIONER

## 2023-03-08 PROCEDURE — 4010F PR ACE/ARB THEARPY RXD/TAKEN: ICD-10-PCS | Mod: ,,, | Performed by: NURSE PRACTITIONER

## 2023-03-08 PROCEDURE — 1159F MED LIST DOCD IN RCRD: CPT | Mod: ,,, | Performed by: NURSE PRACTITIONER

## 2023-03-08 PROCEDURE — 87389 HIV-1 AG W/HIV-1&-2 AB AG IA: CPT | Mod: ,,, | Performed by: CLINICAL MEDICAL LABORATORY

## 2023-03-08 PROCEDURE — 85025 CBC WITH DIFFERENTIAL: ICD-10-PCS | Mod: ,,, | Performed by: CLINICAL MEDICAL LABORATORY

## 2023-03-08 PROCEDURE — 3080F DIAST BP >= 90 MM HG: CPT | Mod: ,,, | Performed by: NURSE PRACTITIONER

## 2023-03-08 PROCEDURE — 3077F PR MOST RECENT SYSTOLIC BLOOD PRESSURE >= 140 MM HG: ICD-10-PCS | Mod: ,,, | Performed by: NURSE PRACTITIONER

## 2023-03-08 PROCEDURE — 99214 OFFICE O/P EST MOD 30 MIN: CPT | Mod: ,,, | Performed by: NURSE PRACTITIONER

## 2023-03-08 PROCEDURE — 80061 LIPID PANEL: CPT | Mod: ,,, | Performed by: CLINICAL MEDICAL LABORATORY

## 2023-03-08 PROCEDURE — 80061 LIPID PANEL: ICD-10-PCS | Mod: ,,, | Performed by: CLINICAL MEDICAL LABORATORY

## 2023-03-08 PROCEDURE — 85025 COMPLETE CBC W/AUTO DIFF WBC: CPT | Mod: ,,, | Performed by: CLINICAL MEDICAL LABORATORY

## 2023-03-08 RX ORDER — AMLODIPINE AND OLMESARTAN MEDOXOMIL 10; 20 MG/1; MG/1
1 TABLET ORAL DAILY
Qty: 90 TABLET | Refills: 1 | Status: SHIPPED | OUTPATIENT
Start: 2023-03-08 | End: 2023-05-05 | Stop reason: ALTCHOICE

## 2023-03-08 NOTE — PROGRESS NOTES
Carline Bateman NP   [facility]  [address]  [place]  [address]      PATIENT NAME: Kal Meadows  : 1995  DATE: 3/8/23  MRN: [id number]      Billing Provider: Carline Bateman NP  Level of Service: [place] OFFICE/OUTPT VISIT, EST, LEVL IV, 30-39 MIN  Patient PCP Information       Provider PCP Type    Carline Bateman NP General            Reason for Visit / Chief Complaint: Hypertension (Here for routine checkup, labs, and refill. Has been out for 2 weeks. )       Update PCP  Update Chief Complaint         History of Present Illness / Problem Focused Workflow     Kal Meadows presents to the clinic with Hypertension (Here for routine checkup, labs, and refill. Has been out for 2 weeks. )     27 year old male presents to clinic for check up and medication refill. He has PMH of HTN and states he has been without medications for over 2 weeks. His blood pressure is extremely elevated in clinic today. He denies headache, blurred vision, or chest pain. He refuses Clonidine. States he will  meds as soon as he leaves and start back on them and BP will come down.       Review of Systems     @Review of Systems   Constitutional:  Negative for activity change, appetite change, fatigue and fever.   HENT:  Negative for nasal congestion, ear pain, rhinorrhea, sinus pressure/congestion and sore throat.    Eyes:  Negative for pain, redness, visual disturbance and eye dryness.   Respiratory:  Negative for cough and shortness of breath.    Cardiovascular:  Negative for chest pain and leg swelling.   Gastrointestinal:  Negative for abdominal distention, abdominal pain, constipation and diarrhea.   Endocrine: Negative for cold intolerance, heat intolerance and polyuria.   Genitourinary:  Negative for bladder incontinence, dysuria, frequency and urgency.   Musculoskeletal:  Negative for arthralgias, gait problem and myalgias.   Integumentary:  Negative for color change, rash and wound.    Allergic/Immunologic: Negative for environmental allergies and food allergies.   Neurological:  Negative for dizziness, weakness, light-headedness and headaches.   Psychiatric/Behavioral:  Negative for behavioral problems and sleep disturbance.      Medical / Social / Family History     Past Medical History:   Diagnosis Date    Hypertension        Past Surgical History:   Procedure Laterality Date    CIRCUMCISION         Social History    reports that he has never smoked. He has never been exposed to tobacco smoke. He has never used smokeless tobacco. He reports that he does not currently use alcohol. He reports that he does not use drugs.    Family History  's family history includes Breast cancer in his maternal grandmother and paternal grandmother; Hypertension in his brother, father, and mother; No Known Problems in his maternal grandfather, paternal grandfather, sister, and son.    Medications and Allergies     Medications  Outpatient Medications Marked as Taking for the 3/8/23 encounter (Office Visit) with Carline Bateman NP   Medication Sig Dispense Refill    [DISCONTINUED] amlodipine-olmesartan (CHRISTIANA) 10-20 mg per tablet Take 1 tablet by mouth once daily. 30 tablet 0       Allergies  Review of patient's allergies indicates:  No Known Allergies    Physical Examination     Vitals:    03/08/23 0900   BP: (!) 156/98   Pulse:    Resp:    Temp:      Physical Exam  Vitals and nursing note reviewed.   HENT:      Head: Normocephalic.      Right Ear: Tympanic membrane normal.      Left Ear: Tympanic membrane normal.      Nose: Nose normal.      Mouth/Throat:      Mouth: Mucous membranes are moist.      Pharynx: Oropharynx is clear. No posterior oropharyngeal erythema.   Eyes:      Conjunctiva/sclera: Conjunctivae normal.   Cardiovascular:      Rate and Rhythm: Normal rate and regular rhythm.      Pulses: Normal pulses.      Heart sounds: Normal heart sounds.   Pulmonary:      Effort: Pulmonary effort is  normal.      Breath sounds: Normal breath sounds.   Abdominal:      General: Abdomen is flat. Bowel sounds are normal. There is no distension.      Palpations: Abdomen is soft.   Musculoskeletal:         General: No swelling or tenderness. Normal range of motion.      Cervical back: Normal range of motion.      Right lower leg: No edema.      Left lower leg: No edema.   Skin:     General: Skin is warm and dry.      Capillary Refill: Capillary refill takes less than 2 seconds.   Neurological:      Mental Status: He is alert. Mental status is at baseline.   Psychiatric:         Mood and Affect: Mood normal.         Behavior: Behavior normal.             Lab Results   Component Value Date    WBC 5.13 03/08/2023    HGB 14.3 03/08/2023    HCT 44.6 03/08/2023    MCV 80.2 03/08/2023     03/08/2023          Sodium   Date Value Ref Range Status   03/08/2023 140 136 - 145 mmol/L Final     Potassium   Date Value Ref Range Status   03/08/2023 4.0 3.5 - 5.1 mmol/L Final     Chloride   Date Value Ref Range Status   03/08/2023 106 98 - 107 mmol/L Final     CO2   Date Value Ref Range Status   03/08/2023 31 21 - 32 mmol/L Final     Glucose   Date Value Ref Range Status   03/08/2023 77 74 - 106 mg/dL Final     BUN   Date Value Ref Range Status   03/08/2023 12 7 - 18 mg/dL Final     Creatinine   Date Value Ref Range Status   03/08/2023 1.14 0.70 - 1.30 mg/dL Final     Calcium   Date Value Ref Range Status   03/08/2023 8.9 8.5 - 10.1 mg/dL Final     Total Protein   Date Value Ref Range Status   03/08/2023 7.5 6.4 - 8.2 g/dL Final     Albumin   Date Value Ref Range Status   03/08/2023 4.1 3.5 - 5.0 g/dL Final     Bilirubin, Total   Date Value Ref Range Status   03/08/2023 0.6 >0.0 - 1.2 mg/dL Final     Alk Phos   Date Value Ref Range Status   03/08/2023 69 45 - 115 U/L Final     AST   Date Value Ref Range Status   03/08/2023 18 15 - 37 U/L Final     ALT   Date Value Ref Range Status   03/08/2023 28 16 - 61 U/L Final     Anion Gap    Date Value Ref Range Status   03/08/2023 7 7 - 16 mmol/L Final     eGFR   Date Value Ref Range Status   07/11/2022 76 >=60 mL/min/1.73m² Final      No image results found.     Procedures   Assessment and Plan (including Health Maintenance)      Problem List  Smart Sets  Document Outside HM   :    Plan:           Problem List Items Addressed This Visit          Cardiac/Vascular    Essential hypertension, benign - Primary    Current Assessment & Plan     Uncontrolled- has been without medications for 2 weeks. Refused Clonidine in clinic. States he will  his meds and take as soon as he gets them. Instructed patient to take meds daily as ordered, do not go without them. Montior BP and keep log. Return to clinic in 2 weeks for follow up to see if BP controlled on current med regimen.   Labs obtained today in clinic.          Relevant Medications    amlodipine-olmesartan (CHRISTIANA) 10-20 mg per tablet    Other Relevant Orders    CBC Auto Differential (Completed)    Comprehensive Metabolic Panel (Completed)    Lipid Panel (Completed)     Other Visit Diagnoses       Encounter for screening for other viral diseases        Relevant Orders    Hepatitis C Antibody (Completed)    HIV 1/2 Ag/Ab (4th Gen) (Completed)            The patient has no Health Maintenance topics of status Not Due    Future Appointments   Date Time Provider Department Center   6/12/2023 10:00 AM Carline Bateman NP [facility]   7/12/2023  3:30 [facility] [place]        Health Maintenance Due   Topic Date Due    TETANUS VACCINE  Never done        Follow up in about 2 weeks (around 3/22/2023).     Signature:  Carline Bateman NP  [place] [address] [place]  [address]    Date of encounter: 3/8/23

## 2023-03-08 NOTE — PROGRESS NOTES
03/08/23 0845   Depression Patient Health Questionnaire (PHQ-2)   Over the last two weeks how often have you been bothered by little interest or pleasure in doing things 0   Over the last two weeks how often have you been bothered by feeling down, depressed or hopeless 0   PHQ-2 Total Score 0   Depression Patient Health Questionnaire (PHQ-9)   Over the last two weeks how often have you been bothered by trouble falling or staying asleep, or sleeping too much 0   Over the last two weeks how often have you been bothered by feeling tired or having little energy 3   Over the last two weeks how often have you been bothered by a poor appetite or overeating 0   Over the last two weeks how often have you been bothered by feeling bad about yourself - or that you are a failure or have let yourself or your family down 0   Over the last two weeks how often have you been bothered by trouble concentrating on things, such as reading the newspaper or watching television 0   Over the last two weeks how often have you been bothered by moving or speaking so slowly that other people could have noticed. Or the opposite - being so fidgety or restless that you have been moving around a lot more than usual. 3   Over the last two weeks how often have you been bothered by thoughts that you would be better off dead, or of hurting yourself 0   If you checked off any problems, how difficult have these problems made it for you to do your work, take care of things at home or get along with other people? Not difficult at all   PHQ-9 Score 6   PHQ-9 Interpretation Mild

## 2023-03-10 NOTE — PROGRESS NOTES
Please contact the patient and let them know that their results were fine and do not require any change in treatment. Continue to monitor blood pressure closely.

## 2023-03-24 VITALS
TEMPERATURE: 97 F | HEIGHT: 74 IN | SYSTOLIC BLOOD PRESSURE: 156 MMHG | DIASTOLIC BLOOD PRESSURE: 98 MMHG | RESPIRATION RATE: 16 BRPM | HEART RATE: 56 BPM | BODY MASS INDEX: 31.11 KG/M2 | WEIGHT: 242.38 LBS | OXYGEN SATURATION: 99 %

## 2023-03-24 PROBLEM — I10 ESSENTIAL HYPERTENSION, BENIGN: Status: ACTIVE | Noted: 2023-03-24

## 2023-03-24 NOTE — ASSESSMENT & PLAN NOTE
Uncontrolled- has been without medications for 2 weeks. Refused Clonidine in clinic. States he will  his meds and take as soon as he gets them. Instructed patient to take meds daily as ordered, do not go without them. Montior BP and keep log. Return to clinic in 2 weeks for follow up to see if BP controlled on current med regimen.   Labs obtained today in clinic.

## 2023-05-04 ENCOUNTER — TELEPHONE (OUTPATIENT)
Dept: FAMILY MEDICINE | Facility: CLINIC | Age: 28
End: 2023-05-04
Payer: COMMERCIAL

## 2023-05-04 NOTE — TELEPHONE ENCOUNTER
Patient's employer called earlier and spoke with Mayra Vicente RN about patient's blood pressure. They reported that patient said he woke up with a headache this morning and took his blood pressure medicine. At 1 pm patient still had a headache so [place] [facility] [place] was 153/116. They reported they let him lay down in a dark quiet room and then rechecked it. His blood pressure was still 138/101. Sriinvas reported patient denied any other symptoms such as chest pain or sob. The decision was made that patient was stable and could come in tomorrow morning. Srinivas was no longer on the line when I picked it up to let them know.     About 30 minutes later patient came to clinic. Henna Lamb LPN spoke with patient and told him to come back in the morning as a walk in and if he started having any chest pain, sob, blurry vision, or if his blood pressure gets worse before then to go to the ER. Patient was asked if he has been taking his blood pressure medication every day and he reports he has.     Patient's blood pressure has been around this range when he comes in for his visits. Provider will discuss treatment plan when patient comes in tomorrow.

## 2023-05-05 ENCOUNTER — OFFICE VISIT (OUTPATIENT)
Dept: FAMILY MEDICINE | Facility: CLINIC | Age: 28
End: 2023-05-05
Payer: COMMERCIAL

## 2023-05-05 VITALS
RESPIRATION RATE: 18 BRPM | SYSTOLIC BLOOD PRESSURE: 128 MMHG | WEIGHT: 241 LBS | DIASTOLIC BLOOD PRESSURE: 94 MMHG | TEMPERATURE: 99 F | BODY MASS INDEX: 30.93 KG/M2 | HEART RATE: 78 BPM | HEIGHT: 74 IN | OXYGEN SATURATION: 98 %

## 2023-05-05 DIAGNOSIS — J30.1 ALLERGIC RHINITIS DUE TO POLLEN, UNSPECIFIED SEASONALITY: ICD-10-CM

## 2023-05-05 DIAGNOSIS — J30.1 SEASONAL ALLERGIC RHINITIS DUE TO POLLEN: ICD-10-CM

## 2023-05-05 DIAGNOSIS — I10 ESSENTIAL HYPERTENSION, BENIGN: Primary | ICD-10-CM

## 2023-05-05 PROCEDURE — 99213 PR OFFICE/OUTPT VISIT, EST, LEVL III, 20-29 MIN: ICD-10-PCS | Mod: ,,, | Performed by: FAMILY MEDICINE

## 2023-05-05 PROCEDURE — 99213 OFFICE O/P EST LOW 20 MIN: CPT | Mod: ,,, | Performed by: FAMILY MEDICINE

## 2023-05-05 PROCEDURE — 3008F PR BODY MASS INDEX (BMI) DOCUMENTED: ICD-10-PCS | Mod: ,,, | Performed by: FAMILY MEDICINE

## 2023-05-05 PROCEDURE — 4010F ACE/ARB THERAPY RXD/TAKEN: CPT | Mod: ,,, | Performed by: FAMILY MEDICINE

## 2023-05-05 PROCEDURE — 3074F SYST BP LT 130 MM HG: CPT | Mod: ,,, | Performed by: FAMILY MEDICINE

## 2023-05-05 PROCEDURE — 3074F PR MOST RECENT SYSTOLIC BLOOD PRESSURE < 130 MM HG: ICD-10-PCS | Mod: ,,, | Performed by: FAMILY MEDICINE

## 2023-05-05 PROCEDURE — 3008F BODY MASS INDEX DOCD: CPT | Mod: ,,, | Performed by: FAMILY MEDICINE

## 2023-05-05 PROCEDURE — 4010F PR ACE/ARB THEARPY RXD/TAKEN: ICD-10-PCS | Mod: ,,, | Performed by: FAMILY MEDICINE

## 2023-05-05 PROCEDURE — 1159F MED LIST DOCD IN RCRD: CPT | Mod: ,,, | Performed by: FAMILY MEDICINE

## 2023-05-05 PROCEDURE — 3080F PR MOST RECENT DIASTOLIC BLOOD PRESSURE >= 90 MM HG: ICD-10-PCS | Mod: ,,, | Performed by: FAMILY MEDICINE

## 2023-05-05 PROCEDURE — 1159F PR MEDICATION LIST DOCUMENTED IN MEDICAL RECORD: ICD-10-PCS | Mod: ,,, | Performed by: FAMILY MEDICINE

## 2023-05-05 PROCEDURE — 3080F DIAST BP >= 90 MM HG: CPT | Mod: ,,, | Performed by: FAMILY MEDICINE

## 2023-05-05 RX ORDER — LISINOPRIL AND HYDROCHLOROTHIAZIDE 10; 12.5 MG/1; MG/1
1 TABLET ORAL DAILY
Qty: 90 TABLET | Refills: 3 | Status: SHIPPED | OUTPATIENT
Start: 2023-05-05 | End: 2024-03-04

## 2023-05-05 RX ORDER — AMLODIPINE BESYLATE 10 MG/1
10 TABLET ORAL DAILY
Qty: 30 TABLET | Refills: 11 | Status: SHIPPED | OUTPATIENT
Start: 2023-05-05 | End: 2024-05-04

## 2023-05-05 RX ORDER — LORATADINE 10 MG/1
10 TABLET ORAL DAILY
Qty: 30 TABLET | Refills: 5 | Status: SHIPPED | OUTPATIENT
Start: 2023-05-05 | End: 2024-03-05

## 2023-05-05 NOTE — LETTER
May 5, 2023      [facility]  [address]  [place] [address]  Phone: [phone]  Fax: [phone]       Patient: Kal Meadows   YOB: 1995  Date of Visit: 05/05/2023    To Whom It May Concern:    Young Meadows  was at [facility] on 05/05/2023. Please excuse patient for 05/04/2023. The patient may return to work/school on 05/08/2023 with no restrictions. If you have any questions or concerns, or if I can be of further assistance, please do not hesitate to contact me.    Sincerely,    Kim Perry RN

## 2023-05-05 NOTE — ASSESSMENT & PLAN NOTE
Blood pressure is poorly controlled.  Will changes medicines to amlodipine 10 mg and give him lisinopril hydrochlorothiazide otherwise.  To follow-up in 1 month.  Monitor his blood pressures at home.

## 2023-05-05 NOTE — ASSESSMENT & PLAN NOTE
A likely related to allergic rhinitis and sinus pressure.  We will treat the patient with the Claritin 10 mg once a day.  Will also treat his blood pressure.  He will follow him back here in 1 month.  Consider using Flonase twice daily.

## 2023-05-05 NOTE — PROGRESS NOTES
Hussein Hathaway DO   [facility]  [address]  [place]  [address]      PATIENT NAME: Kal Meadows  : 1995  DATE: 23  MRN: [id number]      Billing Provider: Hussein Hathaway DO  Level of Service:   Patient PCP Information       Provider PCP Type    Carline Bateman NP General            Reason for Visit / Chief Complaint: Hypertension (Pt c/o elevated blood pressure yesterday at work. Pt also had a headache yesterday but that has improved. )       Update PCP  Update Chief Complaint         History of Present Illness / Problem Focused Workflow     Kal Meadows presents to the clinic with Hypertension (Pt c/o elevated blood pressure yesterday at work. Pt also had a headache yesterday but that has improved. )     Patient is in today for follow-up on his blood pressure.  He states his pressures have not been well controlled on his current medicines.  Also does report a headache with the pain in his sinus area over the last 2-3 months.  He denies any numbness weakness tingling.  He denies any chest pain shortness breast palpitations PND orthopnea.      Review of Systems     Review of Systems   Constitutional:  Negative for activity change, appetite change, chills, fatigue and fever.   HENT:  Negative for nasal congestion, ear discharge, ear pain, mouth dryness, mouth sores, postnasal drip, sinus pressure/congestion, sore throat and voice change.    Eyes:  Negative for pain, discharge, redness, itching and visual disturbance.   Respiratory:  Negative for apnea, cough, chest tightness, shortness of breath and wheezing.    Cardiovascular:  Negative for chest pain, palpitations and leg swelling.   Gastrointestinal:  Negative for abdominal distention, abdominal pain, anal bleeding, blood in stool, change in bowel habit, constipation, diarrhea, nausea, vomiting, reflux and change in bowel habit.   Endocrine: Negative for cold intolerance, heat intolerance, polydipsia, polyphagia and polyuria.    Genitourinary:  Negative for difficulty urinating, enuresis, erectile dysfunction, frequency, genital sores, hematuria and urgency.   Musculoskeletal:  Negative for arthralgias, back pain, gait problem, leg pain, myalgias and neck pain.   Integumentary:  Negative for rash, mole/lesion, breast mass and breast discharge.   Allergic/Immunologic: Negative for environmental allergies and food allergies.   Neurological:  Positive for headaches. Negative for dizziness, vertigo, tremors, seizures, syncope, facial asymmetry, speech difficulty, weakness, light-headedness, numbness, coordination difficulties, memory loss and coordination difficulties.   Hematological:  Negative for adenopathy. Does not bruise/bleed easily.   Psychiatric/Behavioral:  Negative for agitation, behavioral problems, confusion, decreased concentration, dysphoric mood, hallucinations, self-injury, sleep disturbance and suicidal ideas. The patient is not nervous/anxious and is not hyperactive.    Breast: Negative for mass    Medical / Social / Family History     Past Medical History:   Diagnosis Date    Hypertension        Past Surgical History:   Procedure Laterality Date    CIRCUMCISION         Social History    reports that he has never smoked. He has never been exposed to tobacco smoke. He has never used smokeless tobacco. He reports that he does not currently use alcohol. He reports that he does not use drugs.    Family History  's family history includes Breast cancer in his maternal grandmother and paternal grandmother; Diabetes in his paternal grandfather; Hypertension in his brother, father, and mother; No Known Problems in his maternal grandfather, sister, and son.    Medications and Allergies     Medications  Outpatient Medications Marked as Taking for the 5/5/23 encounter (Office Visit) with Hussein Hathaway, DO   Medication Sig Dispense Refill    [DISCONTINUED] amlodipine-olmesartan (CHRISTIANA) 10-20 mg per tablet Take 1 tablet by mouth  once daily. 90 tablet 1       Allergies  Review of patient's allergies indicates:  No Known Allergies    Physical Examination     Vitals:    05/05/23 0852   BP: (!) 128/94   Pulse: 78   Resp: 18   Temp: 98.7 °F (37.1 °C)     Physical Exam  Constitutional:       Appearance: Normal appearance. He is normal weight.   HENT:      Head: Normocephalic.      Nose: Congestion present.      Mouth/Throat:      Mouth: Mucous membranes are moist.      Pharynx: Oropharynx is clear. No oropharyngeal exudate or posterior oropharyngeal erythema.   Eyes:      General: No scleral icterus.        Right eye: No discharge.         Left eye: No discharge.      Conjunctiva/sclera: Conjunctivae normal.      Pupils: Pupils are equal, round, and reactive to light.   Neck:      Vascular: No carotid bruit.   Cardiovascular:      Rate and Rhythm: Normal rate and regular rhythm.      Pulses: Normal pulses.      Heart sounds: Normal heart sounds. No murmur heard.    No friction rub. No gallop.   Pulmonary:      Effort: Pulmonary effort is normal.      Breath sounds: Normal breath sounds. No wheezing.   Abdominal:      General: Abdomen is flat. Bowel sounds are normal.      Tenderness: There is no abdominal tenderness.   Musculoskeletal:         General: Normal range of motion.      Cervical back: Normal range of motion and neck supple.      Right lower leg: No edema.      Left lower leg: No edema.   Skin:     General: Skin is warm.      Capillary Refill: Capillary refill takes less than 2 seconds.      Findings: No rash.   Neurological:      General: No focal deficit present.      Mental Status: He is alert and oriented to person, place, and time. Mental status is at baseline.      Cranial Nerves: No cranial nerve deficit.      Sensory: No sensory deficit.      Motor: No weakness.      Coordination: Coordination normal.      Gait: Gait normal.      Deep Tendon Reflexes: Reflexes normal.   Psychiatric:         Mood and Affect: Mood normal.          Behavior: Behavior normal.         Thought Content: Thought content normal.         Judgment: Judgment normal.             Lab Results   Component Value Date    WBC 5.13 03/08/2023    HGB 14.3 03/08/2023    HCT 44.6 03/08/2023    MCV 80.2 03/08/2023     03/08/2023          Sodium   Date Value Ref Range Status   03/08/2023 140 136 - 145 mmol/L Final     Potassium   Date Value Ref Range Status   03/08/2023 4.0 3.5 - 5.1 mmol/L Final     Chloride   Date Value Ref Range Status   03/08/2023 106 98 - 107 mmol/L Final     CO2   Date Value Ref Range Status   03/08/2023 31 21 - 32 mmol/L Final     Glucose   Date Value Ref Range Status   03/08/2023 77 74 - 106 mg/dL Final     BUN   Date Value Ref Range Status   03/08/2023 12 7 - 18 mg/dL Final     Creatinine   Date Value Ref Range Status   03/08/2023 1.14 0.70 - 1.30 mg/dL Final     Calcium   Date Value Ref Range Status   03/08/2023 8.9 8.5 - 10.1 mg/dL Final     Total Protein   Date Value Ref Range Status   03/08/2023 7.5 6.4 - 8.2 g/dL Final     Albumin   Date Value Ref Range Status   03/08/2023 4.1 3.5 - 5.0 g/dL Final     Bilirubin, Total   Date Value Ref Range Status   03/08/2023 0.6 >0.0 - 1.2 mg/dL Final     Alk Phos   Date Value Ref Range Status   03/08/2023 69 45 - 115 U/L Final     AST   Date Value Ref Range Status   03/08/2023 18 15 - 37 U/L Final     ALT   Date Value Ref Range Status   03/08/2023 28 16 - 61 U/L Final     Anion Gap   Date Value Ref Range Status   03/08/2023 7 7 - 16 mmol/L Final     eGFR   Date Value Ref Range Status   07/11/2022 76 >=60 mL/min/1.73m² Final      No image results found.     Procedures   Lab Results   Component Value Date    CHOL 146 03/08/2023    CHOL 157 07/11/2022     Lab Results   Component Value Date    HDL 41 03/08/2023    HDL 58 07/11/2022     Lab Results   Component Value Date    LDLCALC 93 03/08/2023    LDLCALC 69 07/11/2022     Lab Results   Component Value Date    TRIG 58 03/08/2023    TRIG 149 07/11/2022     Lab  Results   Component Value Date    CHOLHDL 3.6 03/08/2023    CHOLHDL 2.7 07/11/2022      No results found for: LABA1C, HGBA1C   No results found for: TSH, T8QHVQF, F5XSRFD, THYROIDAB, FREET4   Assessment and Plan (including Health Maintenance)      Problem List  Smart Sets  Document Outside HM   :    Plan:         Health Maintenance Due   Topic Date Due    TETANUS VACCINE  Never done       Problem List Items Addressed This Visit          ENT    Seasonal allergic rhinitis due to pollen    Current Assessment & Plan     A likely related to allergic rhinitis and sinus pressure.  We will treat the patient with the Claritin 10 mg once a day.  Will also treat his blood pressure.  He will follow him back here in 1 month.  Consider using Flonase twice daily.           Relevant Medications    loratadine (CLARITIN) 10 mg tablet       Cardiac/Vascular    Essential hypertension, benign - Primary    Current Assessment & Plan     Blood pressure is poorly controlled.  Will changes medicines to amlodipine 10 mg and give him lisinopril hydrochlorothiazide otherwise.  To follow-up in 1 month.  Monitor his blood pressures at home.           Relevant Medications    amLODIPine (NORVASC) 10 MG tablet    lisinopriL-hydrochlorothiazide (PRINZIDE,ZESTORETIC) 10-12.5 mg per tablet     Other Visit Diagnoses       Allergic rhinitis due to pollen, unspecified seasonality        Relevant Medications    loratadine (CLARITIN) 10 mg tablet            The patient has no Health Maintenance topics of status Not Due    Future Appointments   Date Time Provider Department Center   6/12/2023 10:00 AM Carline Bateman NP [facility] [place]   6/22/2023 10:00 AM [facility] [place]   7/12/2023  3:30 PM TATE Corado [facility] [place]        Follow up in about 4 weeks (around 6/2/2023), or if symptoms worsen or fail to improve.     Signature:  Hussein Hathaway DO  [address]  [address]  [place]  [address]    Date of encounter: 5/5/23

## 2023-06-23 ENCOUNTER — OFFICE VISIT (OUTPATIENT)
Dept: FAMILY MEDICINE | Facility: CLINIC | Age: 28
End: 2023-06-23

## 2023-06-23 VITALS
BODY MASS INDEX: 31.32 KG/M2 | SYSTOLIC BLOOD PRESSURE: 150 MMHG | HEART RATE: 62 BPM | RESPIRATION RATE: 17 BRPM | HEIGHT: 74 IN | OXYGEN SATURATION: 99 % | WEIGHT: 244 LBS | DIASTOLIC BLOOD PRESSURE: 98 MMHG | TEMPERATURE: 98 F

## 2023-06-23 DIAGNOSIS — R07.89 CHEST TIGHTNESS: ICD-10-CM

## 2023-06-23 DIAGNOSIS — J01.00 ACUTE NON-RECURRENT MAXILLARY SINUSITIS: ICD-10-CM

## 2023-06-23 DIAGNOSIS — I10 ESSENTIAL HYPERTENSION: Primary | ICD-10-CM

## 2023-06-23 LAB
EKG 12-LEAD: NORMAL
PR INTERVAL: 199
PRT AXES: NORMAL
QRS DURATION: 84
QT/QTC: NORMAL
VENTRICULAR RATE: 53

## 2023-06-23 PROCEDURE — 96372 THER/PROPH/DIAG INJ SC/IM: CPT | Mod: ,,,

## 2023-06-23 PROCEDURE — 99213 OFFICE O/P EST LOW 20 MIN: CPT | Mod: 25,,,

## 2023-06-23 PROCEDURE — 93000 POCT EKG 12-LEAD: ICD-10-PCS | Mod: ,,,

## 2023-06-23 PROCEDURE — 96372 PR INJECTION,THERAP/PROPH/DIAG2ST, IM OR SUBCUT: ICD-10-PCS | Mod: ,,,

## 2023-06-23 PROCEDURE — 99213 PR OFFICE/OUTPT VISIT, EST, LEVL III, 20-29 MIN: ICD-10-PCS | Mod: 25,,,

## 2023-06-23 PROCEDURE — 93000 ELECTROCARDIOGRAM COMPLETE: CPT | Mod: ,,,

## 2023-06-23 RX ORDER — CEFTRIAXONE 1 G/1
1 INJECTION, POWDER, FOR SOLUTION INTRAMUSCULAR; INTRAVENOUS
Status: COMPLETED | OUTPATIENT
Start: 2023-06-23 | End: 2023-06-23

## 2023-06-23 RX ORDER — DEXAMETHASONE SODIUM PHOSPHATE 4 MG/ML
4 INJECTION, SOLUTION INTRA-ARTICULAR; INTRALESIONAL; INTRAMUSCULAR; INTRAVENOUS; SOFT TISSUE
Status: COMPLETED | OUTPATIENT
Start: 2023-06-23 | End: 2023-06-23

## 2023-06-23 RX ORDER — AZITHROMYCIN 250 MG/1
TABLET, FILM COATED ORAL
Qty: 6 TABLET | Refills: 0 | Status: SHIPPED | OUTPATIENT
Start: 2023-06-23 | End: 2023-06-28

## 2023-06-23 RX ORDER — PREDNISONE 20 MG/1
20 TABLET ORAL 2 TIMES DAILY
Qty: 6 TABLET | Refills: 0 | Status: SHIPPED | OUTPATIENT
Start: 2023-06-23 | End: 2023-06-26

## 2023-06-23 RX ADMIN — CEFTRIAXONE 1 G: 1 INJECTION, POWDER, FOR SOLUTION INTRAMUSCULAR; INTRAVENOUS at 04:06

## 2023-06-23 RX ADMIN — DEXAMETHASONE SODIUM PHOSPHATE 4 MG: 4 INJECTION, SOLUTION INTRA-ARTICULAR; INTRALESIONAL; INTRAMUSCULAR; INTRAVENOUS; SOFT TISSUE at 04:06

## 2023-06-23 NOTE — ASSESSMENT & PLAN NOTE
-Rocephin IM and Decadron IM today.   -Azithromycin and Prednisone RX today.  -Discussed with pt that I think the symptoms were related to his sinus infection and drainage. He states the chest pain followed a coughing spell. He states those symptoms have improved since last week. RTC with worsening, new or persistent symptoms with understanding voiced.   - Reviewed pathology of current symptoms, medication side effects/risk/benefits/directions on taking medications, and worsening or persistent symptoms that require  follow up in next 2-3 days. Saline/steroid nasal sprays, antihistamine use, and multivitamin/elderberry/Zinc use were recommended. Patient was instructed to take antibiotic as directed,  complete entire course to avoid antibiotic resistance, and take OTC probiotic with antibiotic to prevent GI upset. Education prevention techniques were recommended, such as PRN antihistamine and saline/steroid nasal spray use, for the remainder of cold/flu season and during seasonal changes.

## 2023-06-23 NOTE — ASSESSMENT & PLAN NOTE
EKG in clinic today and WNL. Discussed with pt, if he started having chest pain, radiating pain, SOB, palpitations then he needs to go to ER to be evaluated. Pt voiced understanding

## 2023-06-23 NOTE — ASSESSMENT & PLAN NOTE
Pt states he did not take his blood pressure medication today. Discussed with pt the importance of taking medication daily with understanding voiced. Potassium level slightly low, increase in diet. Foods high in potassium include green leafy vegetables, potatoes, bananas, avocados, beans. Monitor BP daily and keep BP daily log to bring to next visit. Exercise at least 5 times a week. Keep scheduled appts. RTC sooner if BP is staying <130/80. Dash diet.    DASH- includes foods rich in K+, calcium and Magnesium.The diet limits foods high in sodium, saturated fats and added sugars. This is a flexible balanced eating plan that helps create heart healthy eating style for life. Diet is rich in vegetable, whole grains and fruits. It includes fat free or low fat dairy products, fish, poultry, nuts. Chose foods high in K+, calcium, magnesium, fiber, protein, and low in saturated fats and sodium.

## 2023-06-23 NOTE — PROGRESS NOTES
TATE MONCADA   [place] [id number] [facility] [address]  [place]  [address]      PATIENT NAME: Kal Meadows  : 1995  DATE: 23  MRN: [id number]      Billing Provider: TATE MONCADA  Level of Service:   Patient PCP Information       Provider PCP Type    Carline Bateman NP General            Reason for Visit / Chief Complaint: Headache (Pt c/o daily headaches X 6 days. Pt states that he was exposed to Asbestos on the job. ), Chest Pain (Pt c/o about 3 episodes of chest pain that lasted about an hour in the last week. ), and Sinus Problem (Pt c/o sinus congestion and drng.)         History of Present Illness / Problem Focused Workflow     Kal Meadows presents to the clinic with Headache (Pt c/o daily headaches X 6 days. Pt states that he was exposed to Asbestos on the job. ), Chest Pain (Pt c/o about 3 episodes of chest pain that lasted about an hour in the last week. ), and Sinus Problem (Pt c/o sinus congestion and drng.)     26 y/o male presents to clinic with complaints of nonproductive cough, sore throat, HA, chest tightness, SOB with cough that started last week. He does state the symptoms have improved. 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 While at the job site, he was exposed to Asbestos and he said he has been worried symptoms were related to this. Pt states the job site was vesta and he does has problems with his sinuses several times yearly. He denies any chest pain, radiating pain, numbness, tingling, fever, chills, GI symptoms      Review of Systems     @Review of Systems   Constitutional:  Negative for chills, fatigue, fever and unexpected weight change.   HENT:  Positive for sinus pressure/congestion and sore throat. Negative for nasal congestion and ear pain.    Eyes:  Negative for pain.   Respiratory:  Positive for cough, chest tightness and shortness of breath. Negative for wheezing.    Cardiovascular:  Negative for  chest pain and palpitations.   Gastrointestinal:  Negative for abdominal pain, blood in stool, change in bowel habit, nausea, vomiting, reflux and change in bowel habit.   Musculoskeletal:  Negative for back pain, joint swelling and neck pain.   Neurological:  Positive for headaches. Negative for dizziness, vertigo, seizures, weakness, light-headedness and numbness.   Psychiatric/Behavioral:  Negative for suicidal ideas.      Medical / Social / Family History     Past Medical History:   Diagnosis Date    Hypertension        Past Surgical History:   Procedure Laterality Date    CIRCUMCISION         Social History    reports that he has never smoked. He has never been exposed to tobacco smoke. He has never used smokeless tobacco. He reports that he does not currently use alcohol. He reports that he does not use drugs.    Family History  's family history includes Breast cancer in his maternal grandmother and paternal grandmother; Diabetes in his paternal grandfather; Hypertension in his brother, father, and mother; No Known Problems in his maternal grandfather, sister, and son.    Medications and Allergies     Medications  Outpatient Medications Marked as Taking for the 6/23/23 encounter (Office Visit) with TATE Resendiz   Medication Sig Dispense Refill    amLODIPine (NORVASC) 10 MG tablet Take 1 tablet (10 mg total) by mouth once daily. 30 tablet 11    lisinopriL-hydrochlorothiazide (PRINZIDE,ZESTORETIC) 10-12.5 mg per tablet Take 1 tablet by mouth once daily. 90 tablet 3     Current Facility-Administered Medications for the 6/23/23 encounter (Office Visit) with TATE Resendiz   Medication Dose Route Frequency Provider Last Rate Last Admin    [COMPLETED] cefTRIAXone injection 1 g  1 g Intramuscular 1 time in Clinic/HOD TATE Resendiz   1 g at 06/23/23 1610    [COMPLETED] dexAMETHasone injection 4 mg  4 mg Intramuscular 1 time in Clinic/HOD TATE Resendiz   4 mg at 06/23/23 1610        Allergies  Review of patient's allergies indicates:  No Known Allergies    Physical Examination     Vitals:    06/23/23 1427   BP: (!) 152/102   Pulse: 62   Resp: 17   Temp: 98.3 °F (36.8 °C)     Physical Exam  Vitals and nursing note reviewed.   Constitutional:       General: He is not in acute distress.     Appearance: Normal appearance.   HENT:      Head: Normocephalic.      Right Ear: Tympanic membrane and ear canal normal.      Left Ear: Ear canal and external ear normal. A middle ear effusion is present. Tympanic membrane is erythematous.      Nose:      Right Turbinates: Pale.      Left Turbinates: Pale.      Right Sinus: Maxillary sinus tenderness and frontal sinus tenderness present.      Left Sinus: Maxillary sinus tenderness and frontal sinus tenderness present.      Mouth/Throat:      Lips: Pink.      Mouth: Mucous membranes are moist.      Pharynx: Uvula midline. Posterior oropharyngeal erythema present. No oropharyngeal exudate.   Eyes:      Pupils: Pupils are equal, round, and reactive to light.   Neck:      Vascular: No carotid bruit.   Cardiovascular:      Rate and Rhythm: Normal rate and regular rhythm.      Heart sounds: Normal heart sounds, S1 normal and S2 normal. No murmur heard.    No friction rub. No gallop.   Pulmonary:      Effort: Pulmonary effort is normal. No respiratory distress.      Breath sounds: Normal breath sounds. No decreased breath sounds, wheezing, rhonchi or rales.   Musculoskeletal:         General: No swelling or tenderness. Normal range of motion.      Cervical back: Normal range of motion and neck supple.   Lymphadenopathy:      Cervical: No cervical adenopathy.   Skin:     General: Skin is warm and dry.      Capillary Refill: Capillary refill takes less than 2 seconds.   Neurological:      Mental Status: He is alert and oriented to person, place, and time.   Psychiatric:         Attention and Perception: Attention normal.         Mood and Affect: Mood normal.          Behavior: Behavior normal. Behavior is cooperative.             Lab Results   Component Value Date    WBC 5.13 03/08/2023    HGB 14.3 03/08/2023    HCT 44.6 03/08/2023    MCV 80.2 03/08/2023     03/08/2023        CMP  Sodium   Date Value Ref Range Status   03/08/2023 140 136 - 145 mmol/L Final     Potassium   Date Value Ref Range Status   03/08/2023 4.0 3.5 - 5.1 mmol/L Final     Chloride   Date Value Ref Range Status   03/08/2023 106 98 - 107 mmol/L Final     CO2   Date Value Ref Range Status   03/08/2023 31 21 - 32 mmol/L Final     Glucose   Date Value Ref Range Status   03/08/2023 77 74 - 106 mg/dL Final     BUN   Date Value Ref Range Status   03/08/2023 12 7 - 18 mg/dL Final     Creatinine   Date Value Ref Range Status   03/08/2023 1.14 0.70 - 1.30 mg/dL Final     Calcium   Date Value Ref Range Status   03/08/2023 8.9 8.5 - 10.1 mg/dL Final     Total Protein   Date Value Ref Range Status   03/08/2023 7.5 6.4 - 8.2 g/dL Final     Albumin   Date Value Ref Range Status   03/08/2023 4.1 3.5 - 5.0 g/dL Final     Bilirubin, Total   Date Value Ref Range Status   03/08/2023 0.6 >0.0 - 1.2 mg/dL Final     Alk Phos   Date Value Ref Range Status   03/08/2023 69 45 - 115 U/L Final     AST   Date Value Ref Range Status   03/08/2023 18 15 - 37 U/L Final     ALT   Date Value Ref Range Status   03/08/2023 28 16 - 61 U/L Final     Anion Gap   Date Value Ref Range Status   03/08/2023 7 7 - 16 mmol/L Final     eGFR   Date Value Ref Range Status   03/08/2023 90 >=60 mL/min/1.73m² Final     Procedures   Assessment and Plan (including Health Maintenance)   :    Plan:           Problem List Items Addressed This Visit          ENT    Acute non-recurrent maxillary sinusitis    Current Assessment & Plan     -Rocephin IM and Decadron IM today.   -Azithromycin and Prednisone RX today.  -Discussed with pt that I think the symptoms were related to his sinus infection and drainage. He states the chest pain followed a coughing spell.  He states those symptoms have improved since last week. RTC with worsening, new or persistent symptoms with understanding voiced.   - Reviewed pathology of current symptoms, medication side effects/risk/benefits/directions on taking medications, and worsening or persistent symptoms that require  follow up in next 2-3 days. Saline/steroid nasal sprays, antihistamine use, and multivitamin/elderberry/Zinc use were recommended. Patient was instructed to take antibiotic as directed,  complete entire course to avoid antibiotic resistance, and take OTC probiotic with antibiotic to prevent GI upset. Education prevention techniques were recommended, such as PRN antihistamine and saline/steroid nasal spray use, for the remainder of cold/flu season and during seasonal changes.         Relevant Medications    dexAMETHasone injection 4 mg (Completed)    azithromycin (Z-SUKHDEEP) 250 MG tablet    predniSONE (DELTASONE) 20 MG tablet    cefTRIAXone injection 1 g (Completed)       Cardiac/Vascular    Essential hypertension - Primary    Current Assessment & Plan     Pt states he did not take his blood pressure medication today. Discussed with pt the importance of taking medication daily with understanding voiced. Potassium level slightly low, increase in diet. Foods high in potassium include green leafy vegetables, potatoes, bananas, avocados, beans. Monitor BP daily and keep BP daily log to bring to next visit. Exercise at least 5 times a week. Keep scheduled appts. RTC sooner if BP is staying <130/80. Dash diet.    DASH- includes foods rich in K+, calcium and Magnesium.The diet limits foods high in sodium, saturated fats and added sugars. This is a flexible balanced eating plan that helps create heart healthy eating style for life. Diet is rich in vegetable, whole grains and fruits. It includes fat free or low fat dairy products, fish, poultry, nuts. Chose foods high in K+, calcium, magnesium, fiber, protein, and low in saturated fats  and sodium.         Chest tightness    Current Assessment & Plan     EKG in clinic today and WNL. Discussed with pt, if he started having chest pain, radiating pain, SOB, palpitations then he needs to go to ER to be evaluated. Pt voiced understanding         Relevant Orders    POCT EKG 12-LEAD (Manually Resulted by Ordering Provider)       The patient has no Health Maintenance topics of status Not Due    No future appointments.     There are no preventive care reminders to display for this patient.       No follow-ups on file.     Signature:  TATE MONCADA  [address]  [id number]    Date of encounter: 6/23/23

## 2023-09-25 PROBLEM — J01.00 ACUTE NON-RECURRENT MAXILLARY SINUSITIS: Status: RESOLVED | Noted: 2023-06-23 | Resolved: 2023-09-25

## 2024-03-04 ENCOUNTER — OFFICE VISIT (OUTPATIENT)
Dept: FAMILY MEDICINE | Facility: CLINIC | Age: 29
End: 2024-03-04
Payer: COMMERCIAL

## 2024-03-04 VITALS
HEIGHT: 74 IN | RESPIRATION RATE: 19 BRPM | BODY MASS INDEX: 30.37 KG/M2 | WEIGHT: 236.63 LBS | SYSTOLIC BLOOD PRESSURE: 130 MMHG | OXYGEN SATURATION: 99 % | DIASTOLIC BLOOD PRESSURE: 90 MMHG | TEMPERATURE: 98 F | HEART RATE: 83 BPM

## 2024-03-04 DIAGNOSIS — R30.0 DYSURIA: Primary | ICD-10-CM

## 2024-03-04 DIAGNOSIS — I10 HYPERTENSION, UNSPECIFIED TYPE: ICD-10-CM

## 2024-03-04 LAB
BILIRUB SERPL-MCNC: NEGATIVE MG/DL
BLOOD URINE, POC: NEGATIVE
COLOR, POC UA: YELLOW
GLUCOSE UR QL STRIP: NEGATIVE
HIV 1+O+2 AB SERPL QL: NORMAL
KETONES UR QL STRIP: NEGATIVE
LEUKOCYTE ESTERASE URINE, POC: NEGATIVE
NITRITE, POC UA: NEGATIVE
PH, POC UA: 7
PROTEIN, POC: NEGATIVE
SPECIFIC GRAVITY, POC UA: 1.02
SYPHILIS AB INTERPRETATION: NORMAL
UROBILINOGEN, POC UA: 0.2

## 2024-03-04 PROCEDURE — 1159F MED LIST DOCD IN RCRD: CPT | Mod: ,,, | Performed by: FAMILY MEDICINE

## 2024-03-04 PROCEDURE — 3008F BODY MASS INDEX DOCD: CPT | Mod: ,,, | Performed by: FAMILY MEDICINE

## 2024-03-04 PROCEDURE — 3080F DIAST BP >= 90 MM HG: CPT | Mod: ,,, | Performed by: FAMILY MEDICINE

## 2024-03-04 PROCEDURE — 1160F RVW MEDS BY RX/DR IN RCRD: CPT | Mod: ,,, | Performed by: FAMILY MEDICINE

## 2024-03-04 PROCEDURE — 86780 TREPONEMA PALLIDUM: CPT | Mod: ,,, | Performed by: CLINICAL MEDICAL LABORATORY

## 2024-03-04 PROCEDURE — 87491 CHLMYD TRACH DNA AMP PROBE: CPT | Mod: ,,, | Performed by: CLINICAL MEDICAL LABORATORY

## 2024-03-04 PROCEDURE — 87389 HIV-1 AG W/HIV-1&-2 AB AG IA: CPT | Mod: ,,, | Performed by: CLINICAL MEDICAL LABORATORY

## 2024-03-04 PROCEDURE — 87661 TRICHOMONAS VAGINALIS AMPLIF: CPT | Mod: ,,, | Performed by: CLINICAL MEDICAL LABORATORY

## 2024-03-04 PROCEDURE — 81003 URINALYSIS AUTO W/O SCOPE: CPT | Mod: QW,,, | Performed by: FAMILY MEDICINE

## 2024-03-04 PROCEDURE — 87086 URINE CULTURE/COLONY COUNT: CPT | Mod: ,,, | Performed by: CLINICAL MEDICAL LABORATORY

## 2024-03-04 PROCEDURE — 3075F SYST BP GE 130 - 139MM HG: CPT | Mod: ,,, | Performed by: FAMILY MEDICINE

## 2024-03-04 PROCEDURE — 87591 N.GONORRHOEAE DNA AMP PROB: CPT | Mod: ,,, | Performed by: CLINICAL MEDICAL LABORATORY

## 2024-03-04 PROCEDURE — 99214 OFFICE O/P EST MOD 30 MIN: CPT | Mod: ,,, | Performed by: FAMILY MEDICINE

## 2024-03-04 RX ORDER — CHLORTHALIDONE 25 MG/1
25 TABLET ORAL DAILY
Qty: 30 TABLET | Refills: 11 | Status: SHIPPED | OUTPATIENT
Start: 2024-03-04 | End: 2025-03-04

## 2024-03-04 NOTE — PROGRESS NOTES
Jez Reaves DO   [facility]  [address] [address] [address]  [phone]      PATIENT NAME: Kal Meadows  : 1995  DATE: 3/4/24  MRN: [id number]      Billing Provider: Jez Reaves DO  Level of Service:   Patient PCP Information       Provider PCP Type    Carline Bateman NP General            Reason for Visit / Chief Complaint: Dysuria (Kal Meadows 28 year old male presents with frequency and burning during urination x2-3 weeks. Reports he does drink mostly soda no water. He has been sexually active within the past month.), Health Maintenance (We discussed these care gaps COVID-19 Vaccine(1) Never done --Patient declined/Influenza Vaccine(1) due on 2023--patient declined), and Hypertension (Patient is supposed to be taking lisinopril-hydrochlorothiazide, but due to his dad and brother bothhaving an allergic reaction to this medication which causes their lips to swell, he never picked it up from pharmacy. Can we please try him on something else BP is 150/96 and he has had BP meds this morning.)       Update PCP  Update Chief Complaint         History of Present Illness / Problem Focused Workflow     Kal Meadows presents to the clinic with Dysuria (Kal Meadows 28 year old male presents with frequency and burning during urination x2-3 weeks. Reports he does drink mostly soda no water. He has been sexually active within the past month.), Health Maintenance (We discussed these care gaps COVID-19 Vaccine(1) Never done --Patient declined/Influenza Vaccine(1) due on 2023--patient declined), and Hypertension (Patient is supposed to be taking lisinopril-hydrochlorothiazide, but due to his dad and brother bothhaving an allergic reaction to this medication which causes their lips to swell, he never picked it up from pharmacy. Can we please try him on something else BP is 150/96 and he has had BP meds this morning.)     Dysuria   Pertinent  negatives include no chills, hematuria, nausea, vomiting, constipation or rash. His past medical history is significant for hypertension.   Hypertension  Pertinent negatives include no chest pain, headaches, palpitations or shortness of breath.       Review of Systems     Review of Systems   Constitutional:  Negative for chills, diaphoresis and fever.   HENT:  Negative for congestion and sore throat.    Respiratory:  Negative for shortness of breath.    Cardiovascular:  Negative for chest pain and palpitations.   Gastrointestinal:  Negative for abdominal pain, constipation, diarrhea, nausea and vomiting.   Genitourinary:  Positive for dysuria. Negative for hematuria.   Skin:  Negative for rash.   Neurological:  Negative for dizziness, light-headedness and headaches.       Medical / Social / Family History     Past Medical History:   Diagnosis Date    Hypertension        Past Surgical History:   Procedure Laterality Date    CIRCUMCISION         Social History  Mr. Meadows  reports that he has never smoked. He has never been exposed to tobacco smoke. He has never used smokeless tobacco. He reports that he does not currently use alcohol. He reports that he does not use drugs.    Family History  Mr.'s Meadows family history includes Breast cancer in his maternal grandmother and paternal grandmother; Diabetes in his paternal grandfather; Hypertension in his brother, father, and mother; No Known Problems in his maternal grandfather, sister, and son.    Medications and Allergies     Medications  Outpatient Medications Marked as Taking for the 3/4/24 encounter (Office Visit) with Jez Reaves, DO   Medication Sig Dispense Refill    amLODIPine (NORVASC) 10 MG tablet Take 1 tablet (10 mg total) by mouth once daily. 30 tablet 11       Allergies  Review of patient's allergies indicates:  No Known Allergies    Physical Examination     Vitals:    03/04/24 1541   BP: (!) 130/90   Pulse:    Resp:    Temp:      Physical  Exam  Constitutional:       General: He is not in acute distress.     Appearance: He is not ill-appearing, toxic-appearing or diaphoretic.   HENT:      Head: Normocephalic and atraumatic.      Right Ear: External ear normal.      Left Ear: External ear normal.      Nose: No congestion or rhinorrhea.      Mouth/Throat:      Mouth: Mucous membranes are moist.      Pharynx: No oropharyngeal exudate or posterior oropharyngeal erythema.   Eyes:      General: No scleral icterus.        Right eye: No discharge.         Left eye: No discharge.      Pupils: Pupils are equal, round, and reactive to light.   Cardiovascular:      Rate and Rhythm: Normal rate.      Heart sounds: No murmur heard.     No friction rub. No gallop.   Pulmonary:      Effort: No respiratory distress.      Breath sounds: No stridor. No wheezing, rhonchi or rales.   Abdominal:      General: There is no distension.      Tenderness: There is no abdominal tenderness. There is no guarding.   Musculoskeletal:         General: No swelling or deformity.      Right lower leg: No edema.      Left lower leg: No edema.   Neurological:      General: No focal deficit present.      Mental Status: He is alert and oriented to person, place, and time.         Assessment and Plan (including Health Maintenance)      Problem List  Smart Sets  Document Outside HM   :    Plan:         Health Maintenance Due   Topic Date Due    COVID-19 Vaccine (1) Never done       Problem List Items Addressed This Visit    None  Visit Diagnoses       Dysuria    -  Primary    Relevant Orders    POCT URINALYSIS W/O SCOPE (Completed)    Urine culture (Completed)    Chlamydia/GC, PCR (Completed)    HIV 1/2 Ag/Ab (4th Gen) (Completed)    Trichomonas vaginalis by PCR (Completed)    Syphilis Antibody with reflex to RPR (Completed)    Hypertension, unspecified type        Relevant Medications    chlorthalidone (HYGROTEN) 25 MG Tab        Patient denies any penile discharge, fevers or chills.  Patient  is sexually active with 1 partner.  Patient will be worked up for possible STI.  Continuing management  contingent upon results.  Patient has also not taken any medication for hypertension.  Patient will be switched to chlorthalidone given his concerns about lisinopril.  Patient given blood pressure log.  Follow up appointment in 2 weeks.  Patient counseled at length to call, return to clinic or present to the ED should symptoms persist or worsen.  Patient's signals understanding and agreement with the plan of care.    The patient has no Health Maintenance topics of status Not Due    Future Appointments   Date Time Provider Department Center   3/18/2024  3:40 PM Jez Reaves DO [facility]            Signature:  Jez Reaves DO  [facility]  [address] [address] [address]  [phone]    Date of encounter: 3/4/24

## 2024-03-05 LAB
CHLAMYDIA BY PCR: NEGATIVE
N. GONORRHOEAE (GC) BY PCR: NEGATIVE
TRICHOMONAS NAT: NEGATIVE

## 2024-03-06 LAB — UA COMPLETE W REFLEX CULTURE PNL UR: NORMAL

## 2024-04-01 ENCOUNTER — OFFICE VISIT (OUTPATIENT)
Dept: FAMILY MEDICINE | Facility: CLINIC | Age: 29
End: 2024-04-01
Payer: COMMERCIAL

## 2024-04-01 VITALS
SYSTOLIC BLOOD PRESSURE: 136 MMHG | OXYGEN SATURATION: 99 % | RESPIRATION RATE: 18 BRPM | HEIGHT: 74 IN | DIASTOLIC BLOOD PRESSURE: 82 MMHG | TEMPERATURE: 98 F | WEIGHT: 227.19 LBS | BODY MASS INDEX: 29.16 KG/M2 | HEART RATE: 89 BPM

## 2024-04-01 DIAGNOSIS — R09.81 HEAD CONGESTION: ICD-10-CM

## 2024-04-01 DIAGNOSIS — J02.9 SORE THROAT: ICD-10-CM

## 2024-04-01 DIAGNOSIS — R05.1 ACUTE COUGH: ICD-10-CM

## 2024-04-01 DIAGNOSIS — J06.9 UPPER RESPIRATORY TRACT INFECTION, UNSPECIFIED TYPE: Primary | ICD-10-CM

## 2024-04-01 LAB
CTP QC/QA: YES
FLUAV AG NPH QL: NEGATIVE
FLUBV AG NPH QL: NEGATIVE
MOLECULAR STREP A: NEGATIVE
SARS-COV-2 RDRP RESP QL NAA+PROBE: NEGATIVE

## 2024-04-01 RX ORDER — GUAIFENESIN AND DEXTROMETHORPHAN HYDROBROMIDE 1200; 60 MG/1; MG/1
1 TABLET, EXTENDED RELEASE ORAL 2 TIMES DAILY
Qty: 20 TABLET | Refills: 0 | Status: SHIPPED | OUTPATIENT
Start: 2024-04-01 | End: 2024-04-11

## 2024-04-01 RX ORDER — AZITHROMYCIN 250 MG/1
TABLET, FILM COATED ORAL
Qty: 6 TABLET | Refills: 0 | Status: SHIPPED | OUTPATIENT
Start: 2024-04-01 | End: 2024-04-06

## 2024-04-01 RX ORDER — DEXAMETHASONE SODIUM PHOSPHATE 4 MG/ML
4 INJECTION, SOLUTION INTRA-ARTICULAR; INTRALESIONAL; INTRAMUSCULAR; INTRAVENOUS; SOFT TISSUE
Status: COMPLETED | OUTPATIENT
Start: 2024-04-01 | End: 2024-04-01

## 2024-04-01 RX ORDER — CEFTRIAXONE 1 G/1
1 INJECTION, POWDER, FOR SOLUTION INTRAMUSCULAR; INTRAVENOUS
Status: COMPLETED | OUTPATIENT
Start: 2024-04-01 | End: 2024-04-01

## 2024-04-01 RX ADMIN — CEFTRIAXONE 1 G: 1 INJECTION, POWDER, FOR SOLUTION INTRAMUSCULAR; INTRAVENOUS at 10:04

## 2024-04-01 RX ADMIN — DEXAMETHASONE SODIUM PHOSPHATE 4 MG: 4 INJECTION, SOLUTION INTRA-ARTICULAR; INTRALESIONAL; INTRAMUSCULAR; INTRAVENOUS; SOFT TISSUE at 10:04

## 2024-04-01 NOTE — LETTER
April 1, 2024      [facility] [address] [place] [address]  Phone: [phone]  Fax: [phone]       Patient: Kal Meadows   YOB: 1995  Date of Visit: 04/01/2024    To Whom It May Concern:    Young Meadows  was at [facility] on 04/01/2024. The patient may return to work/school on 04/02/2024  with no restrictions. If you have any questions or concerns, or if I can be of further assistance, please do not hesitate to contact me.    Sincerely,        ROSA M Mccain

## 2024-04-01 NOTE — ASSESSMENT & PLAN NOTE
Head congestion, sore throat  Negative covid/flu, strep  Discussed risks/benefits/alternatives for treatment  Rocephin, decadron IM  Zithromax po  Rest. Increase fluids as tolerated

## 2024-04-01 NOTE — PROGRESS NOTES
TATE Gaytan   [facility]  [address] [address] [address]  [phone]      PATIENT NAME: Kal Meadows  : 1995  DATE: 24  MRN: [id number]      Billing Provider: TATE Gaytan  Level of Service:   Patient PCP Information       Provider PCP Type    Carline Bateman NP General            Reason for Visit / Chief Complaint: Fever, Generalized Body Aches, Headache, Cough, and Sore Throat (All symptoms started x2 days/Pt is being swab for covid/flu and strep/)       Update PCP  Update Chief Complaint         History of Present Illness / Problem Focused Workflow     28 year old male presents with complaints of congestion, fever, body aches  Symptoms started about 2 days ago      Review of Systems     Review of Systems   Constitutional:  Positive for fatigue and fever.   HENT:  Positive for congestion, sinus pressure and sore throat. Negative for ear pain.    Respiratory:  Positive for cough. Negative for shortness of breath.    Cardiovascular:  Negative for chest pain.   Gastrointestinal:  Negative for abdominal pain, constipation, diarrhea and vomiting.   Endocrine: Negative for polydipsia and polyuria.   Musculoskeletal:  Negative for gait problem.   Allergic/Immunologic: Negative for environmental allergies.   Neurological:  Negative for dizziness, weakness and headaches.   Psychiatric/Behavioral:  Negative for dysphoric mood. The patient is not nervous/anxious.        Medical / Social / Family History     Past Medical History:   Diagnosis Date    Hypertension        Past Surgical History:   Procedure Laterality Date    CIRCUMCISION         Social History    reports that he has never smoked. He has never been exposed to tobacco smoke. He has never used smokeless tobacco. He reports that he does not currently use alcohol. He reports that he does not use drugs.    Family History  's family history includes Breast cancer in his  maternal grandmother and paternal grandmother; Diabetes in his paternal grandfather; Hypertension in his brother, father, and mother; No Known Problems in his maternal grandfather, sister, and son.    Medications and Allergies     Medications  Outpatient Medications Marked as Taking for the 4/1/24 encounter (Office Visit) with Tia Monreal FNP   Medication Sig Dispense Refill    amLODIPine (NORVASC) 10 MG tablet Take 1 tablet (10 mg total) by mouth once daily. 30 tablet 11    chlorthalidone (HYGROTEN) 25 MG Tab Take 1 tablet (25 mg total) by mouth once daily. 30 tablet 11     Current Facility-Administered Medications for the 4/1/24 encounter (Office Visit) with Tia Monreal FNP   Medication Dose Route Frequency Provider Last Rate Last Admin    [COMPLETED] cefTRIAXone injection 1 g  1 g Intramuscular 1 time in Clinic/HOD Tia Monreal FNP   1 g at 04/01/24 1002    [COMPLETED] dexAMETHasone injection 4 mg  4 mg Intramuscular 1 time in Clinic/HOD Tia Monreal FNP   4 mg at 04/01/24 1002       Allergies  Review of patient's allergies indicates:  No Known Allergies    Physical Examination     Vitals:    04/01/24 0903   BP: 136/82   Pulse: 89   Resp: 18   Temp: 98 °F (36.7 °C)     Physical Exam  Constitutional:       General: He is not in acute distress.     Appearance: He is ill-appearing.   HENT:      Ears:      Comments: Redness to bilat TM     Nose: Congestion present. No rhinorrhea.      Mouth/Throat:      Mouth: Mucous membranes are moist.      Pharynx: Oropharyngeal exudate and posterior oropharyngeal erythema present.   Eyes:      Extraocular Movements: Extraocular movements intact.   Cardiovascular:      Rate and Rhythm: Normal rate.   Pulmonary:      Effort: Pulmonary effort is normal. No respiratory distress.      Breath sounds: No wheezing.   Abdominal:      General: Bowel sounds are normal.      Palpations: Abdomen is soft.      Tenderness: There is no abdominal tenderness.   Musculoskeletal:          General: Normal range of motion.      Cervical back: Normal range of motion.   Skin:     General: Skin is warm.   Neurological:      Mental Status: He is alert.   Psychiatric:         Behavior: Behavior normal.           Imaging / Labs     Office Visit on 04/01/2024   Component Date Value Ref Range Status    POC Rapid COVID 04/01/2024 Negative  Negative Final     Acceptable 04/01/2024 Yes   Final    Rapid Influenza A Ag 04/01/2024 Negative  Negative Final    Rapid Influenza B Ag 04/01/2024 Negative  Negative Final     Acceptable 04/01/2024 Yes   Final    Molecular Strep A, POC 04/01/2024 Negative  Negative Final     Acceptable 04/01/2024 Yes   Final     No image results found.      Assessment and Plan (including Health Maintenance)      Problem List  Smart Sets  Document Outside HM   :    Health Maintenance Due   Topic Date Due    COVID-19 Vaccine (1) Never done       Problem List Items Addressed This Visit          ENT    Upper respiratory tract infection - Primary    Current Assessment & Plan     Head congestion, sore throat  Negative covid/flu, strep  Discussed risks/benefits/alternatives for treatment  Rocephin, decadron IM  Zithromax po  Rest. Increase fluids as tolerated         Relevant Medications    cefTRIAXone injection 1 g (Completed)    dexAMETHasone injection 4 mg (Completed)    azithromycin (Z-SUKHDEEP) 250 MG tablet    dextromethorphan-guaiFENesin (MUCINEX DM) 60-1,200 mg per 12 hr tablet     Other Visit Diagnoses       Head congestion        Relevant Medications    dexAMETHasone injection 4 mg (Completed)    dextromethorphan-guaiFENesin (MUCINEX DM) 60-1,200 mg per 12 hr tablet    Acute cough        Relevant Orders    POCT COVID-19 Rapid Screening (Completed)    POCT Influenza A/B (Completed)    Sore throat        Relevant Orders    POCT Strep A, Molecular (Completed)            The patient has no Health Maintenance topics of status Not Due    No future  appointments.       Signature:  TATE Gaytan  [facility] [address] [place] [address]  [phone]    Date of encounter: 4/1/24

## 2024-06-06 ENCOUNTER — OFFICE VISIT (OUTPATIENT)
Dept: FAMILY MEDICINE | Facility: CLINIC | Age: 29
End: 2024-06-06
Payer: COMMERCIAL

## 2024-06-06 VITALS
TEMPERATURE: 98 F | RESPIRATION RATE: 16 BRPM | DIASTOLIC BLOOD PRESSURE: 84 MMHG | BODY MASS INDEX: 28.36 KG/M2 | HEART RATE: 78 BPM | OXYGEN SATURATION: 98 % | HEIGHT: 74 IN | WEIGHT: 221 LBS | SYSTOLIC BLOOD PRESSURE: 129 MMHG

## 2024-06-06 DIAGNOSIS — R30.0 DYSURIA: ICD-10-CM

## 2024-06-06 DIAGNOSIS — I10 ESSENTIAL HYPERTENSION, BENIGN: Primary | ICD-10-CM

## 2024-06-06 PROCEDURE — 99214 OFFICE O/P EST MOD 30 MIN: CPT | Mod: ,,, | Performed by: FAMILY MEDICINE

## 2024-06-06 PROCEDURE — 3074F SYST BP LT 130 MM HG: CPT | Mod: ,,, | Performed by: FAMILY MEDICINE

## 2024-06-06 PROCEDURE — 1159F MED LIST DOCD IN RCRD: CPT | Mod: ,,, | Performed by: FAMILY MEDICINE

## 2024-06-06 PROCEDURE — 3079F DIAST BP 80-89 MM HG: CPT | Mod: ,,, | Performed by: FAMILY MEDICINE

## 2024-06-06 PROCEDURE — 3008F BODY MASS INDEX DOCD: CPT | Mod: ,,, | Performed by: FAMILY MEDICINE

## 2024-06-06 RX ORDER — CEFTRIAXONE 1 G/1
1 INJECTION, POWDER, FOR SOLUTION INTRAMUSCULAR; INTRAVENOUS
Status: SHIPPED | OUTPATIENT
Start: 2024-06-06

## 2024-06-06 RX ORDER — AMLODIPINE BESYLATE 10 MG/1
10 TABLET ORAL DAILY
Qty: 30 TABLET | Refills: 11 | Status: SHIPPED | OUTPATIENT
Start: 2024-06-06 | End: 2025-06-06

## 2024-06-06 NOTE — PROGRESS NOTES
Health Maintenance Due   Topic Date Due    COVID-19 Vaccine (1 - 2023-24 season) Patient Declined

## 2024-06-28 PROCEDURE — 87591 N.GONORRHOEAE DNA AMP PROB: CPT | Mod: ,,, | Performed by: CLINICAL MEDICAL LABORATORY

## 2024-06-28 PROCEDURE — 87491 CHLMYD TRACH DNA AMP PROBE: CPT | Mod: ,,, | Performed by: CLINICAL MEDICAL LABORATORY

## 2024-06-29 LAB
CHLAMYDIA BY PCR: NEGATIVE
N. GONORRHOEAE (GC) BY PCR: NEGATIVE

## 2024-07-03 NOTE — PROGRESS NOTES
Jez Reaves DO   [facility]  [address] [address]  [place]  [address]      PATIENT NAME: Kal Meadows  : 1995  DATE: 24  MRN: [id number]      Billing Provider: Jez Reaves DO  Level of Service:   Patient PCP Information       Provider PCP Type    Carline Bateman NP General            Reason for Visit / Chief Complaint: Follow-up (Patient is a 28 year old male presenting today for a follow-up on Hypertension. ), Hypertension (Pt is needing a refill on his amlodipine. He has been out of both BP meds x 2 days. He has refills on chlorthalidone at the pharmacy. He is also due lab work. He hasn't had this done since 2023. ), and Dysuria (Reports he was having this problem the last time he was here. He states symptoms just started back recently. Dysuria mostly in the mornings. He also c/o urinary frequency. )         History of Present Illness / Problem Focused Workflow     HPI    HPI as noted.  Patient denies penile discharge.  Patient would like workup for STI.    Review of Systems     @Review of Systems   Constitutional:  Negative for chills, diaphoresis and fever.   HENT:  Negative for sore throat.    Eyes:  Negative for visual disturbance.   Respiratory:  Negative for cough and shortness of breath.    Cardiovascular:  Negative for chest pain and palpitations.   Gastrointestinal:  Negative for abdominal pain, diarrhea, nausea and vomiting.   Genitourinary:  Negative for dysuria and hematuria.   Musculoskeletal:  Negative for arthralgias and leg pain.   Integumentary:  Negative for rash.   Neurological:  Negative for dizziness, light-headedness, numbness and headaches.       Medical / Social / Family History     Past Medical History:   Diagnosis Date    Hypertension        Past Surgical History:   Procedure Laterality Date    CIRCUMCISION         Medications and Allergies     Medications  Outpatient Medications Marked as Taking for the 24 encounter (Office Visit) with  Jez Reaves DO   Medication Sig Dispense Refill    chlorthalidone (HYGROTEN) 25 MG Tab Take 1 tablet (25 mg total) by mouth once daily. 30 tablet 11    [DISCONTINUED] amLODIPine (NORVASC) 10 MG tablet Take 1 tablet (10 mg total) by mouth once daily. 30 tablet 11     Current Facility-Administered Medications for the 6/6/24 encounter (Office Visit) with Jez Reaves DO   Medication Dose Route Frequency Provider Last Rate Last Admin    cefTRIAXone injection 1 g  1 g Intramuscular 1 time in Clinic/HOD Jez Reaves DO           Allergies  Review of patient's allergies indicates:  No Known Allergies    Physical Examination     Vitals:    06/06/24 1525   BP: 129/84   Pulse: 78   Resp: 16   Temp: 98.1 °F (36.7 °C)     Physical Exam  Vitals and nursing note reviewed.   Constitutional:       General: He is not in acute distress.     Appearance: He is not ill-appearing, toxic-appearing or diaphoretic.   HENT:      Head: Normocephalic and atraumatic.      Right Ear: External ear normal.      Left Ear: External ear normal.      Nose: Nose normal.      Mouth/Throat:      Pharynx: No oropharyngeal exudate or posterior oropharyngeal erythema.   Eyes:      General: No scleral icterus.        Right eye: No discharge.         Left eye: No discharge.      Extraocular Movements: Extraocular movements intact.   Neck:      Vascular: No carotid bruit.   Cardiovascular:      Rate and Rhythm: Normal rate and regular rhythm.      Pulses: Normal pulses.      Heart sounds: Normal heart sounds. No murmur heard.     No friction rub. No gallop.   Pulmonary:      Effort: Pulmonary effort is normal. No respiratory distress.      Breath sounds: No stridor. No wheezing, rhonchi or rales.   Chest:      Chest wall: No tenderness.   Abdominal:      Palpations: Abdomen is soft.      Tenderness: There is no abdominal tenderness. There is no guarding.   Musculoskeletal:         General: No swelling.      Right lower leg: No edema.      Left  lower leg: No edema.   Skin:     General: Skin is warm and dry.   Neurological:      Mental Status: He is alert and oriented to person, place, and time.               Lab Results   Component Value Date    WBC 5.56 06/28/2024    HGB 13.8 06/28/2024    HCT 44.0 06/28/2024    MCV 81.5 06/28/2024     06/28/2024        CMP  Sodium   Date Value Ref Range Status   06/28/2024 140 136 - 145 mmol/L Final     Potassium   Date Value Ref Range Status   06/28/2024 4.1 3.5 - 5.1 mmol/L Final     Chloride   Date Value Ref Range Status   06/28/2024 104 98 - 107 mmol/L Final     CO2   Date Value Ref Range Status   06/28/2024 29 21 - 32 mmol/L Final     Glucose   Date Value Ref Range Status   06/28/2024 70 (L) 74 - 106 mg/dL Final     BUN   Date Value Ref Range Status   06/28/2024 14 7 - 18 mg/dL Final     Creatinine   Date Value Ref Range Status   06/28/2024 1.20 0.70 - 1.30 mg/dL Final     Calcium   Date Value Ref Range Status   06/28/2024 9.4 8.5 - 10.1 mg/dL Final     Total Protein   Date Value Ref Range Status   06/28/2024 7.4 6.4 - 8.2 g/dL Final     Albumin   Date Value Ref Range Status   06/28/2024 4.1 3.5 - 5.0 g/dL Final     Bilirubin, Total   Date Value Ref Range Status   06/28/2024 0.6 >0.0 - 1.2 mg/dL Final     Alk Phos   Date Value Ref Range Status   06/28/2024 68 45 - 115 U/L Final     AST   Date Value Ref Range Status   06/28/2024 21 15 - 37 U/L Final     ALT   Date Value Ref Range Status   06/28/2024 32 16 - 61 U/L Final     Anion Gap   Date Value Ref Range Status   06/28/2024 11 7 - 16 mmol/L Final     eGFR   Date Value Ref Range Status   06/28/2024 84 >=60 mL/min/1.73m2 Final     Procedures   Assessment and Plan (including Health Maintenance)   :    Plan:     Problem List Items Addressed This Visit    None  Visit Diagnoses       Essential hypertension, benign    -  Primary    Relevant Medications    amLODIPine (NORVASC) 10 MG tablet    Other Relevant Orders    CBC Auto Differential (Completed)     Comprehensive Metabolic Panel (Completed)    Lipid Panel (Completed)    Dysuria        Relevant Medications    cefTRIAXone injection 1 g    Other Relevant Orders    POCT URINALYSIS W/O SCOPE    RPR (Completed)    HIV 1/2 Ag/Ab (4th Gen) (Completed)    Syphilis Antibody with reflex to RPR (Completed)    Chlamydia/GC, PCR (Completed)  Patient will be treated empirically today.  Additional antibiotics may be furnished contingent upon lab results.  Patient will be notified of results.  Patient understands and agrees with the plan of care.            Health Maintenance Topics with due status: Not Due       Topic Last Completion Date    Influenza Vaccine 03/08/2023       Future Appointments   Date Time Provider Department Center   9/11/2024  3:40 PM Carline Bateman, NP [facility]        Health Maintenance Due   Topic Date Due    Pneumococcal Vaccines (Age 0-64) (1 of 2 - PCV) Never done    TETANUS VACCINE  Never done    COVID-19 Vaccine (1 - 2023-24 season) Never done          Signature:  Jez Reaves [facility]  [address]  [address]    Date of encounter: 6/6/24

## 2024-11-01 ENCOUNTER — OFFICE VISIT (OUTPATIENT)
Dept: FAMILY MEDICINE | Facility: CLINIC | Age: 29
End: 2024-11-01
Payer: COMMERCIAL

## 2024-11-01 VITALS
RESPIRATION RATE: 18 BRPM | HEART RATE: 72 BPM | WEIGHT: 241 LBS | HEIGHT: 74 IN | DIASTOLIC BLOOD PRESSURE: 98 MMHG | OXYGEN SATURATION: 98 % | BODY MASS INDEX: 30.93 KG/M2 | TEMPERATURE: 98 F | SYSTOLIC BLOOD PRESSURE: 130 MMHG

## 2024-11-01 DIAGNOSIS — R53.83 FATIGUE, UNSPECIFIED TYPE: Primary | ICD-10-CM

## 2024-11-01 LAB
ALBUMIN SERPL BCP-MCNC: 3.8 G/DL (ref 3.5–5)
ALBUMIN/GLOB SERPL: 1.1 {RATIO}
ALP SERPL-CCNC: 59 U/L (ref 45–115)
ALT SERPL W P-5'-P-CCNC: 41 U/L (ref 16–61)
ANION GAP SERPL CALCULATED.3IONS-SCNC: 10 MMOL/L (ref 7–16)
AST SERPL W P-5'-P-CCNC: 21 U/L (ref 15–37)
BASOPHILS # BLD AUTO: 0.05 K/UL (ref 0–0.2)
BASOPHILS NFR BLD AUTO: 0.8 % (ref 0–1)
BILIRUB SERPL-MCNC: 0.6 MG/DL (ref ?–1.2)
BUN SERPL-MCNC: 12 MG/DL (ref 7–18)
BUN/CREAT SERPL: 11 (ref 6–20)
CALCIUM SERPL-MCNC: 9.3 MG/DL (ref 8.5–10.1)
CHLORIDE SERPL-SCNC: 106 MMOL/L (ref 98–107)
CO2 SERPL-SCNC: 29 MMOL/L (ref 21–32)
CREAT SERPL-MCNC: 1.12 MG/DL (ref 0.7–1.3)
DIFFERENTIAL METHOD BLD: ABNORMAL
EGFR (NO RACE VARIABLE) (RUSH/TITUS): 91 ML/MIN/1.73M2
EOSINOPHIL # BLD AUTO: 0.38 K/UL (ref 0–0.5)
EOSINOPHIL NFR BLD AUTO: 6.4 % (ref 1–4)
ERYTHROCYTE [DISTWIDTH] IN BLOOD BY AUTOMATED COUNT: 14.6 % (ref 11.5–14.5)
FOLATE SERPL-MCNC: 10.8 NG/ML (ref 3.1–17.5)
GLOBULIN SER-MCNC: 3.6 G/DL (ref 2–4)
GLUCOSE SERPL-MCNC: 80 MG/DL (ref 74–106)
HCT VFR BLD AUTO: 46.1 % (ref 40–54)
HGB BLD-MCNC: 14.6 G/DL (ref 13.5–18)
IMM GRANULOCYTES # BLD AUTO: 0.01 K/UL (ref 0–0.04)
IMM GRANULOCYTES NFR BLD: 0.2 % (ref 0–0.4)
LYMPHOCYTES # BLD AUTO: 2.29 K/UL (ref 1–4.8)
LYMPHOCYTES NFR BLD AUTO: 38.8 % (ref 27–41)
MCH RBC QN AUTO: 25 PG (ref 27–31)
MCHC RBC AUTO-ENTMCNC: 31.7 G/DL (ref 32–36)
MCV RBC AUTO: 79.1 FL (ref 80–96)
MONOCYTES # BLD AUTO: 0.49 K/UL (ref 0–0.8)
MONOCYTES NFR BLD AUTO: 8.3 % (ref 2–6)
MPC BLD CALC-MCNC: 9.9 FL (ref 9.4–12.4)
NEUTROPHILS # BLD AUTO: 2.68 K/UL (ref 1.8–7.7)
NEUTROPHILS NFR BLD AUTO: 45.5 % (ref 53–65)
NRBC # BLD AUTO: 0 X10E3/UL
NRBC, AUTO (.00): 0 %
PLATELET # BLD AUTO: 294 K/UL (ref 150–400)
POTASSIUM SERPL-SCNC: 4 MMOL/L (ref 3.5–5.1)
PROT SERPL-MCNC: 7.4 G/DL (ref 6.4–8.2)
RBC # BLD AUTO: 5.83 M/UL (ref 4.6–6.2)
SODIUM SERPL-SCNC: 141 MMOL/L (ref 136–145)
TSH SERPL DL<=0.005 MIU/L-ACNC: 0.93 UIU/ML (ref 0.36–3.74)
VIT B12 SERPL-MCNC: 322 PG/ML (ref 193–986)
WBC # BLD AUTO: 5.9 K/UL (ref 4.5–11)

## 2024-11-01 PROCEDURE — 84443 ASSAY THYROID STIM HORMONE: CPT | Mod: ,,, | Performed by: CLINICAL MEDICAL LABORATORY

## 2024-11-01 PROCEDURE — 82746 ASSAY OF FOLIC ACID SERUM: CPT | Mod: ,,, | Performed by: CLINICAL MEDICAL LABORATORY

## 2024-11-01 PROCEDURE — 80053 COMPREHEN METABOLIC PANEL: CPT | Mod: ,,, | Performed by: CLINICAL MEDICAL LABORATORY

## 2024-11-01 PROCEDURE — 85025 COMPLETE CBC W/AUTO DIFF WBC: CPT | Mod: ,,, | Performed by: CLINICAL MEDICAL LABORATORY

## 2024-11-01 PROCEDURE — 82607 VITAMIN B-12: CPT | Mod: ,,, | Performed by: CLINICAL MEDICAL LABORATORY

## 2024-11-08 ENCOUNTER — TELEPHONE (OUTPATIENT)
Dept: FAMILY MEDICINE | Facility: CLINIC | Age: 29
End: 2024-11-08
Payer: COMMERCIAL

## 2024-11-08 NOTE — TELEPHONE ENCOUNTER
----- Message from Jez Reaves DO sent at 11/7/2024  1:30 PM CST -----  Please advise the patient has labs were grossly normal and require no change in management at this time.  Thank you.

## 2024-11-08 NOTE — TELEPHONE ENCOUNTER
Attempted to reach patient to discuss lab results. No answer,no voicemail. Send patient message through the patient portal.

## 2025-02-07 ENCOUNTER — OFFICE VISIT (OUTPATIENT)
Dept: FAMILY MEDICINE | Facility: CLINIC | Age: 30
End: 2025-02-07
Payer: COMMERCIAL

## 2025-02-07 VITALS
BODY MASS INDEX: 32.42 KG/M2 | HEART RATE: 90 BPM | SYSTOLIC BLOOD PRESSURE: 154 MMHG | OXYGEN SATURATION: 98 % | WEIGHT: 252.63 LBS | HEIGHT: 74 IN | TEMPERATURE: 99 F | DIASTOLIC BLOOD PRESSURE: 88 MMHG | RESPIRATION RATE: 24 BRPM

## 2025-02-07 DIAGNOSIS — R05.9 COUGH, UNSPECIFIED TYPE: ICD-10-CM

## 2025-02-07 DIAGNOSIS — R35.0 URINARY FREQUENCY: ICD-10-CM

## 2025-02-07 DIAGNOSIS — J02.9 SORE THROAT: ICD-10-CM

## 2025-02-07 DIAGNOSIS — R52 GENERALIZED BODY ACHES: ICD-10-CM

## 2025-02-07 DIAGNOSIS — I10 ESSENTIAL HYPERTENSION: ICD-10-CM

## 2025-02-07 DIAGNOSIS — J32.9 SINUSITIS, UNSPECIFIED CHRONICITY, UNSPECIFIED LOCATION: Primary | ICD-10-CM

## 2025-02-07 DIAGNOSIS — J06.9 UPPER RESPIRATORY TRACT INFECTION, UNSPECIFIED TYPE: ICD-10-CM

## 2025-02-07 DIAGNOSIS — R51.9 ACUTE NONINTRACTABLE HEADACHE, UNSPECIFIED HEADACHE TYPE: ICD-10-CM

## 2025-02-07 LAB
CTP QC/QA: YES
MOLECULAR STREP A: NEGATIVE
POC MOLECULAR INFLUENZA A AGN: NEGATIVE
POC MOLECULAR INFLUENZA B AGN: NEGATIVE
SARS-COV-2 RDRP RESP QL NAA+PROBE: NEGATIVE

## 2025-02-07 PROCEDURE — 96372 THER/PROPH/DIAG INJ SC/IM: CPT | Mod: ,,,

## 2025-02-07 PROCEDURE — 87502 INFLUENZA DNA AMP PROBE: CPT | Mod: QW,,,

## 2025-02-07 PROCEDURE — 99214 OFFICE O/P EST MOD 30 MIN: CPT | Mod: 25,,,

## 2025-02-07 PROCEDURE — 1160F RVW MEDS BY RX/DR IN RCRD: CPT | Mod: ,,,

## 2025-02-07 PROCEDURE — 87651 STREP A DNA AMP PROBE: CPT | Mod: QW,,,

## 2025-02-07 PROCEDURE — 3008F BODY MASS INDEX DOCD: CPT | Mod: ,,,

## 2025-02-07 PROCEDURE — 3077F SYST BP >= 140 MM HG: CPT | Mod: ,,,

## 2025-02-07 PROCEDURE — 3079F DIAST BP 80-89 MM HG: CPT | Mod: ,,,

## 2025-02-07 PROCEDURE — 1159F MED LIST DOCD IN RCRD: CPT | Mod: ,,,

## 2025-02-07 PROCEDURE — 87635 SARS-COV-2 COVID-19 AMP PRB: CPT | Mod: QW,,,

## 2025-02-07 RX ORDER — FLUTICASONE PROPIONATE 50 MCG
2 SPRAY, SUSPENSION (ML) NASAL DAILY
Qty: 11.1 ML | Refills: 0 | Status: SHIPPED | OUTPATIENT
Start: 2025-02-07

## 2025-02-07 RX ORDER — CEFTRIAXONE 1 G/1
1 INJECTION, POWDER, FOR SOLUTION INTRAMUSCULAR; INTRAVENOUS
Status: COMPLETED | OUTPATIENT
Start: 2025-02-07 | End: 2025-02-07

## 2025-02-07 RX ORDER — AMOXICILLIN AND CLAVULANATE POTASSIUM 875; 125 MG/1; MG/1
1 TABLET, FILM COATED ORAL EVERY 12 HOURS
Qty: 14 TABLET | Refills: 0 | Status: SHIPPED | OUTPATIENT
Start: 2025-02-07 | End: 2025-02-14

## 2025-02-07 RX ORDER — KETOROLAC TROMETHAMINE 30 MG/ML
60 INJECTION, SOLUTION INTRAMUSCULAR; INTRAVENOUS
Status: COMPLETED | OUTPATIENT
Start: 2025-02-07 | End: 2025-02-07

## 2025-02-07 RX ORDER — IBUPROFEN 800 MG/1
800 TABLET ORAL 3 TIMES DAILY
Qty: 30 TABLET | Refills: 0 | Status: SHIPPED | OUTPATIENT
Start: 2025-02-07

## 2025-02-07 RX ADMIN — CEFTRIAXONE 1 G: 1 INJECTION, POWDER, FOR SOLUTION INTRAMUSCULAR; INTRAVENOUS at 04:02

## 2025-02-07 RX ADMIN — KETOROLAC TROMETHAMINE 60 MG: 30 INJECTION, SOLUTION INTRAMUSCULAR; INTRAVENOUS at 04:02

## 2025-02-07 NOTE — PROGRESS NOTES
Subjective     Patient ID: Kal Meadows is a 29 y.o. male.    Chief Complaint: Headache (Patient is a 29 year old male who presents to the clinic related to headache since this morning.  ), Generalized Body Aches (Patient reports body aches, chills today, has not checked temperature. ), Cough (Patient reports cough, started today. ), and Sore Throat (Patient reports sore throat today. )    Headache   Associated symptoms include coughing and a sore throat.   Cough  Associated symptoms include headaches and a sore throat.   Sore Throat   Associated symptoms include coughing and headaches.     Review of Systems   HENT:  Positive for sore throat.    Respiratory:  Positive for cough.    Neurological:  Positive for headaches.          Objective     Physical Exam  Vitals and nursing note reviewed.   Constitutional:       Appearance: Normal appearance.   HENT:      Head: Normocephalic.      Right Ear: Tympanic membrane, ear canal and external ear normal.      Left Ear: Tympanic membrane, ear canal and external ear normal.      Nose:      Right Sinus: Frontal sinus tenderness present.      Left Sinus: Frontal sinus tenderness present.      Mouth/Throat:      Mouth: Mucous membranes are moist.      Pharynx: Oropharynx is clear.   Eyes:      Pupils: Pupils are equal, round, and reactive to light.   Cardiovascular:      Rate and Rhythm: Normal rate and regular rhythm.      Pulses: Normal pulses.      Heart sounds: Normal heart sounds.   Pulmonary:      Effort: Pulmonary effort is normal.      Breath sounds: Normal breath sounds.   Musculoskeletal:         General: Normal range of motion.      Cervical back: Normal range of motion.   Skin:     General: Skin is warm.      Capillary Refill: Capillary refill takes less than 2 seconds.   Neurological:      General: No focal deficit present.      Mental Status: He is alert.   Psychiatric:         Mood and Affect: Mood normal.            Assessment and Plan     1. Acute  nonintractable headache, unspecified headache type  -     POCT COVID-19 Rapid Screening  -     POCT Influenza A/B Molecular  -     POCT Strep A, Molecular  -     Urinalysis, Reflex to Urine Culture    2. Generalized body aches  -     POCT COVID-19 Rapid Screening  -     POCT Influenza A/B Molecular  -     POCT Strep A, Molecular    3. Cough, unspecified type  -     POCT COVID-19 Rapid Screening  -     POCT Influenza A/B Molecular  -     POCT Strep A, Molecular    4. Sore throat  -     POCT COVID-19 Rapid Screening  -     POCT Influenza A/B Molecular  -     POCT Strep A, Molecular    5. Urinary frequency    6. Upper respiratory tract infection, unspecified type    7. Essential hypertension    Other orders  -     ketorolac injection 60 mg  -     ibuprofen (ADVIL,MOTRIN) 800 MG tablet; Take 1 tablet (800 mg total) by mouth 3 (three) times daily.  Dispense: 30 tablet; Refill: 0        Pt advised to f/u in 2 weeks for BP. Endorses urinary frequency, ua ordered. No neuro deficits noted.          Follow up in about 2 months (around 4/7/2025) for blood pressure.

## 2025-02-17 ENCOUNTER — TELEPHONE (OUTPATIENT)
Dept: FAMILY MEDICINE | Facility: CLINIC | Age: 30
End: 2025-02-17
Payer: COMMERCIAL

## 2025-02-17 NOTE — TELEPHONE ENCOUNTER
I tried to call patient to tell him that we needed a urine on him. Patient has a voicemail that is not yet set up and his mother's number won't go through.

## 2025-08-08 ENCOUNTER — OFFICE VISIT (OUTPATIENT)
Dept: FAMILY MEDICINE | Facility: CLINIC | Age: 30
End: 2025-08-08
Payer: COMMERCIAL

## 2025-08-08 VITALS
HEIGHT: 74 IN | WEIGHT: 242.81 LBS | DIASTOLIC BLOOD PRESSURE: 83 MMHG | TEMPERATURE: 97 F | OXYGEN SATURATION: 97 % | SYSTOLIC BLOOD PRESSURE: 132 MMHG | RESPIRATION RATE: 16 BRPM | BODY MASS INDEX: 31.16 KG/M2 | HEART RATE: 70 BPM

## 2025-08-08 DIAGNOSIS — R36.9 DISCHARGE FROM PENIS: ICD-10-CM

## 2025-08-08 DIAGNOSIS — I10 ESSENTIAL HYPERTENSION, BENIGN: Primary | ICD-10-CM

## 2025-08-08 DIAGNOSIS — J31.0 CHRONIC RHINITIS: ICD-10-CM

## 2025-08-08 DIAGNOSIS — Z72.51 HIGH RISK SEXUAL BEHAVIOR, UNSPECIFIED TYPE: ICD-10-CM

## 2025-08-08 PROBLEM — J06.9 UPPER RESPIRATORY TRACT INFECTION: Status: RESOLVED | Noted: 2024-04-01 | Resolved: 2025-08-08

## 2025-08-08 PROBLEM — J30.1 SEASONAL ALLERGIC RHINITIS DUE TO POLLEN: Status: RESOLVED | Noted: 2023-05-05 | Resolved: 2025-08-08

## 2025-08-08 PROBLEM — R07.89 CHEST TIGHTNESS: Status: RESOLVED | Noted: 2023-06-23 | Resolved: 2025-08-08

## 2025-08-08 LAB
ALBUMIN SERPL BCP-MCNC: 4 G/DL (ref 3.5–5)
ALBUMIN/GLOB SERPL: 1.3 {RATIO}
ALP SERPL-CCNC: 66 U/L (ref 40–150)
ALT SERPL W P-5'-P-CCNC: 20 U/L
ANION GAP SERPL CALCULATED.3IONS-SCNC: 10 MMOL/L (ref 7–16)
AST SERPL W P-5'-P-CCNC: 46 U/L (ref 11–45)
BILIRUB SERPL-MCNC: 0.4 MG/DL
BUN SERPL-MCNC: 15 MG/DL (ref 9–21)
BUN/CREAT SERPL: 16 (ref 6–20)
CALCIUM SERPL-MCNC: 9.3 MG/DL (ref 8.4–10.2)
CHLORIDE SERPL-SCNC: 103 MMOL/L (ref 98–107)
CO2 SERPL-SCNC: 29 MMOL/L (ref 22–29)
CREAT SERPL-MCNC: 0.92 MG/DL (ref 0.72–1.25)
EGFR (NO RACE VARIABLE) (RUSH/TITUS): 115 ML/MIN/1.73M2
GLOBULIN SER-MCNC: 3.2 G/DL (ref 2–4)
GLUCOSE SERPL-MCNC: 99 MG/DL (ref 74–100)
HCV AB SER QL: NORMAL
HIV 1+O+2 AB SERPL QL: NORMAL
POTASSIUM SERPL-SCNC: 3.5 MMOL/L (ref 3.5–5.1)
PROT SERPL-MCNC: 7.2 G/DL (ref 6.4–8.3)
SODIUM SERPL-SCNC: 138 MMOL/L (ref 136–145)
SYPHILIS AB INTERPRETATION: NORMAL

## 2025-08-08 RX ORDER — AMLODIPINE BESYLATE 10 MG/1
10 TABLET ORAL DAILY
Qty: 90 TABLET | Refills: 1 | Status: CANCELLED | OUTPATIENT
Start: 2025-08-08 | End: 2026-02-04

## 2025-08-08 RX ORDER — AMLODIPINE BESYLATE AND OLMESARTAN MEDOXOMIL 5; 40 MG/1; MG/1
1 TABLET ORAL DAILY
Qty: 30 TABLET | Refills: 0 | Status: SHIPPED | OUTPATIENT
Start: 2025-08-08 | End: 2026-08-08

## 2025-08-08 NOTE — ASSESSMENT & PLAN NOTE
-not at goal  -annual CBC and urinalysis/ microalbumin   -semiannual lipid or treatment with statin based on ASCVD risk  - patient about DASH diet, mediterranean diet and physical activity guidelines  -change amlodipine to [place]  -Advised to take half the first few days RTC one month to check renal fx and efficacy  -His dad and brother had angioedema with ace-i so gave anticipatory guidance   -at this time he's not concerning for sleep apnea

## 2025-08-08 NOTE — PROGRESS NOTES
Established Patient Visit with MD     Patient Name: Kal Meadows   : 1995  MRN: [id number]    Provider: CHRISSIE GUERRERO MD  Date of visit: 2025       Subjective History:       Reason for Visit / Chief Complaint: Establish Care (Patient is here to establish care for management of Hypertension. Pt is not fasting. Last routine labs were 2024) and Hypertension (Patient is needing a refill on blood pressure medication today. Reports he has been waking up in the mornings with his mouth very dry x 2 months. )        Mr. Meadows is a 29 y.o. male who presents today to address  Mr. Meadows's pertinent medical comorbid conditions include Hypertension and seasonal allergies  When addressing the dry mouth I asked about urine which led to him divulging dysuria   Inconsistent condom use  He politely declines  exam today  He does snore but not apneic   He feels rested no headaches   His dad and bro had HTN and do not tolerate ACEi     Review of Systems   HENT:  Positive for congestion. Negative for sore throat.         Endorses snoring; denies fatigue or apnea    Eyes:  Negative for blurred vision and double vision.   Cardiovascular:  Negative for chest pain and leg swelling.   Neurological:  Negative for headaches.         The following components of this electronic health record were reviewed and updated as appropriate:  Medical History:   Past Medical History:   Diagnosis Date    Chest tightness 2023    Hypertension     Seasonal allergic rhinitis due to pollen 2023    Upper respiratory tract infection 2024     Surgical History:   Past Surgical History:   Procedure Laterality Date    CIRCUMCISION       Known Family History:   Family History   Problem Relation Name Age of Onset    Hypertension Mother      Diabetes type II Mother      Hypertension Father      Hypertension Sister      Hypertension Brother      No Known Problems Son      No Known Problems Son      Breast cancer Maternal Grandmother       "No Known Problems Maternal Grandfather      Breast cancer Paternal Grandmother      Diabetes Paternal Grandfather        Social history:  Tobacco Use History[1]   Social History     Substance and Sexual Activity   Alcohol Use Yes    Comment: occ           Objective Findings:     Vitals:    08/08/25 1417   BP: 132/83   Pulse: 70   Resp: 16   Temp: 97.3 °F (36.3 °C)   TempSrc: Oral   SpO2: 97%   Weight: 110.1 kg (242 lb 12.8 oz)   Height: 6' 2" (1.88 m)   PainSc: 0-No pain      Body mass index is 31.17 kg/m².        Physical Exam  Vitals reviewed.   Constitutional:       Appearance: Normal appearance.   HENT:      Nose: Congestion present.      Right Turbinates: Enlarged.      Left Turbinates: Enlarged.      Right Sinus: No maxillary sinus tenderness or frontal sinus tenderness.      Left Sinus: No maxillary sinus tenderness or frontal sinus tenderness.      Mouth/Throat:      Mouth: Mucous membranes are moist.      Pharynx: Postnasal drip present.      Tonsils: No tonsillar exudate or tonsillar abscesses.   Eyes:      General: Allergic shiner present.      Conjunctiva/sclera:      Right eye: Right conjunctiva is not injected.      Left eye: Left conjunctiva is not injected.   Cardiovascular:      Rate and Rhythm: Normal rate and regular rhythm.      Pulses: Normal pulses.      Heart sounds: Normal heart sounds.   Pulmonary:      Effort: Pulmonary effort is normal. No respiratory distress.   Musculoskeletal:      Right lower leg: No edema.      Left lower leg: No edema.   Neurological:      Mental Status: He is alert.         I have reviewed and addressed the following lab results:    Sodium   Date Value Ref Range Status   11/01/2024 141 136 - 145 mmol/L Final     Potassium   Date Value Ref Range Status   11/01/2024 4.0 3.5 - 5.1 mmol/L Final     Chloride   Date Value Ref Range Status   11/01/2024 106 98 - 107 mmol/L Final     CO2   Date Value Ref Range Status   11/01/2024 29 21 - 32 mmol/L Final     Glucose   Date " Value Ref Range Status   11/01/2024 80 74 - 106 mg/dL Final     BUN   Date Value Ref Range Status   11/01/2024 12 7 - 18 mg/dL Final     Creatinine   Date Value Ref Range Status   11/01/2024 1.12 0.70 - 1.30 mg/dL Final     Calcium   Date Value Ref Range Status   11/01/2024 9.3 8.5 - 10.1 mg/dL Final     Total Protein   Date Value Ref Range Status   11/01/2024 7.4 6.4 - 8.2 g/dL Final     Albumin   Date Value Ref Range Status   11/01/2024 3.8 3.5 - 5.0 g/dL Final     Bilirubin, Total   Date Value Ref Range Status   11/01/2024 0.6 >0.0 - 1.2 mg/dL Final     Alk Phos   Date Value Ref Range Status   11/01/2024 59 45 - 115 U/L Final     AST   Date Value Ref Range Status   11/01/2024 21 15 - 37 U/L Final     ALT   Date Value Ref Range Status   11/01/2024 41 16 - 61 U/L Final     Anion Gap   Date Value Ref Range Status   11/01/2024 10 7 - 16 mmol/L Final     eGFR   Date Value Ref Range Status   07/11/2022 76 >=60 mL/min/1.73m² Final            Assessment and Plan:          Problem List Items Addressed This Visit          Unprioritized    Essential hypertension - Primary    Current Assessment & Plan   -not at goal  -annual CBC and urinalysis/ microalbumin   -semiannual lipid or treatment with statin based on ASCVD risk  - patient about DASH diet, mediterranean diet and physical activity guidelines  -change amlodipine to [place]  -Advised to take half the first few days RTC one month to check renal fx and efficacy  -His dad and brother had angioedema with ace-i so gave anticipatory guidance   -at this time he's not concerning for sleep apnea            Relevant Medications    amlodipine-olmesartan (CHRISTIANA) 5-40 mg per tablet    Discharge from penis    Current Assessment & Plan   -anticipatory guidance about partner tx discussed         Relevant Orders    POCT URINALYSIS W/O SCOPE    Trichomonas vaginalis, RNA, Qual    Urine Culture High Risk ($$)    High risk sexual behavior    Current Assessment & Plan   -appropriate  screenings ordered based on inconsistent condom use         Relevant Orders    Chlamydia/GC, PCR    Treponema Pallidum (Syphillis) Antibody    HIV 1/2 Ag/Ab (4th Gen)    Hepatitis C Antibody    Chronic rhinitis    Current Assessment & Plan   -intranasal steroid and 2nd gen antihistamine discussed  -discussed proper flonase nasal spray technique  -RTC in 6 weeks; consider Singulair  -may want to escalate to allergy            I am Kal Meadows's Primary care provider  Using shared decision making, Kal and I will decide upon the best course of action to achieve health maintenance goals   Kal has been counseled on the importance of  safer sex practices and blood pressure control  All of Kal's questions and concerns today have been addressed   We plan to follow up every 6 months once stable      Jewell Patricia MD  Medical Director   [facility]    2025         [1]   Social History  Tobacco Use   Smoking Status Former    Types: Vaping with nicotine    Start date: 2024    Quit date: 2024    Years since quittin.9    Passive exposure: Never   Smokeless Tobacco Never   Tobacco Comments    Patient stopped vaping x 2 months ago.

## 2025-08-08 NOTE — ASSESSMENT & PLAN NOTE
-intranasal steroid and 2nd gen antihistamine discussed  -discussed proper flonase nasal spray technique  -RTC in 6 weeks; consider Singulair  -may want to escalate to allergy    No significant past surgical history

## 2025-08-12 ENCOUNTER — RESULTS FOLLOW-UP (OUTPATIENT)
Dept: FAMILY MEDICINE | Facility: CLINIC | Age: 30
End: 2025-08-12
Payer: COMMERCIAL